# Patient Record
Sex: MALE | URBAN - METROPOLITAN AREA
[De-identification: names, ages, dates, MRNs, and addresses within clinical notes are randomized per-mention and may not be internally consistent; named-entity substitution may affect disease eponyms.]

---

## 2018-02-20 NOTE — CR
Chest: Portable view of the chest was obtained.

 

Comparison: No prior study.

 

Heart size appears within normal limits for portable technique.  Mild 

tortuosity of the thoracic aorta is seen.  Lungs are clear.  Bony 

structures are grossly intact.

 

Impression:

1.  Nothing acute is seen on portable chest x-ray.

 

Diagnostic code #1

## 2018-02-20 NOTE — CT
Head CT

 

Technique: Multiple axial sections through the brain were obtained.  

Intravenous contrast was not utilized.

 

Comparison: No prior intracranial imaging.

 

Findings: Ventricles along with basal cisterns and sulci over the 

convexities appear within normal limits for the patient's age.  No 

abnormal parenchymal densities are seen.  No evidence of intracranial 

hemorrhage.  Slight atherosclerotic calcification is seen within the 

vertebral vessels and carotid siphon.  No midline shift or mass effect

 is seen.

 

Bone window settings were reviewed which shows moderate mucosal 

thickening within the ethmoid sinuses as well as minimal mucosal 

thickening within the frontal and maxillary sinuses.  Mastoid sinuses 

are clear.  No acute calvarial abnormality is seen.

 

Impression:

1.  Sinus disease which is most likely chronic as described above.

2.  Nothing acute is appreciated on noncontrast head CT exam.

 

Diagnostic code #2

## 2018-02-20 NOTE — PCM.HP
H&P History of Present Illness





- General


Date of Service: 18


Admit Problem/Dx: 


Chest Pain





Source of Information: Patient, Provider, RN, RN Notes Reviewed


History Limitations: Reports: No Limitations





- History of Present Illness


Initial Comments - Free Text/Narative: 


Conrado Brito is a 56 yo male who presents to our ED today (18) with chest 

pain.  Reports the pain started 3 days ago and he did notice diaphoresis, 

intermittent nausea, and at times left arm numbness.  He describes the numbness 

as a tingling sensation in his left arm.  Denies any numbness in any other 

extremities.  Denies any extremity weakness.  Denies any change in speech.  He 

has significant family history of CAD.  The patient herself does not have a CAD 

history, however he does have risk factors including diabetes, HLD, HTN, and 

obesity.  He reports some of his uncles  in their 60s.  Father is alive and 

doing well however he did have a quadruple bypass in his 60s.





In the temp was 35.9 Celsius.  Pulse 68.  Respirations 20.  /70.  Pulse 

ox 99% on oxygen.  The lead EKG is obtained that showed Q waves in 2, 3, aVF.  

There is poor R-wave progression.  He does have a first-degree heart block.  Is 

also nonspecific ST changes noted.  Labs are obtained: Diabetes she is normal 

at 7.79.  Hemoglobin is 14.3.  Hematocrit 41.4.  He is normocytic.  Platelet 

are good at 237,000.  Neutrophils are good at 41%.  There is no bandemia.  PT 

is 10.7.  INR 0.98.  APTT 26.  It was 141.  Potassium 3.5.  Chloride 102.  

Anoxic 24.  Anion gap is quite high at 18.5.  BUN is 21.  Creatinine 1.1.  EGFR 

is given 60.  Glucose is slightly high at 123.  Calcium is 9.1.  Albumin 0.4.  

Liver enzymes looked good with AST at 24, ALT at 49, alkaline phosphatase at 

82.  Troponin initially was 0.046.  It was rechecked and found to be 0.047.  

Protein is high at 8.3.  Albumin good at 4.1.  Patient was given 324 mg aspirin

, 5 mg Lopressor, 2 mg morphine, sublingual nitroglycerin 0.4, and 1 g 

nitroglycerin paste.  Heart was obtained and found to be 5. Head CT was 

obtained and interpreted by Dr. Lomeli as "1.  Sinus disease which is most 

likely chronic as described.  2.  Nothing acute appreciated and noncontrast 

head CT exam."  Portable chest x-rays obtained and shows nothing acute as 

interpreted by Dr. Lomeli.





He carries a history of: Impaired vision, HLD, HTN, type II DM.  He reportedly 

has undergone 2 prior stress tests which were both negative.  He was never a 

smoker.





He says be admitted for chest pain rule out.  He is a full code.  PCP is Dr. Nelida Velazquez at [facility] in [place].





  ** Chest


Pain Score (Numeric/FACES): 2





- Related Data


Allergies/Adverse Reactions: 


 Allergies











Allergy/AdvReac Type Severity Reaction Status Date / Time


 


No Known Allergies Allergy   Verified 18 21:13











Home Medications: 


 Home Meds





Aspirin [Ecotrin] 81 mg PO DAILY 18 [History]


Doxazosin [Cardura] 4 mg PO DAILY 18 [History]


Enalapril [Vasotec] 10 mg PO DAILY 18 [History]


Ezetimibe [Zetia] 10 mg PO DAILY 18 [History]


Hydrochlorothiazide 25 mg PO DAILY 18 [History]


Insulin Glargine,Hum.Rec.Anlog [Toujeo Solostar] 40 unit SQ BEDTIME 18 [

History]


Nitroglycerin 0.4 mg SL ASDIRECTED PRN 18 [History]


Omeprazole 20 mg PO DAILY 18 [History]


SitaGLIPtin [Januvia] 100 mg PO DAILY 18 [History]


atorvaSTATin [Lipitor] 80 mg PO DAILY 18 [History]


metFORMIN [Glucophage] 500 mg PO BIDMEALS 18 [History]











Past Medical History


HEENT History: Reports: Impaired Vision


Other HEENT History: wears eyeglasses.


Cardiovascular History: Reports: High Cholesterol, Hypertension


Musculoskeletal History: Reports: Fracture


Endocrine/Metabolic History: Reports: Diabetes, Type II, IDDM





- Infectious Disease History


Infectious Disease History: Reports: Chicken Pox, Measles, Mumps





- Past Surgical History


Cardiovascular Surgical History: Reports: Other (See Below)


Other Cardiovascular Surgeries/Procedures: 2 stress tests, negative for MI.


GI Surgical History: Reports: Appendectomy


Musculoskeletal Surgical History: Reports: Shoulder Surgery





Social & Family History





- Tobacco Use


Smoking Status *Q: Never Smoker


Second Hand Smoke Exposure: No





- Caffeine Use


Caffeine Use: Reports: Coffee





- Alcohol Use


Days Per Week of Alcohol Use: 3


Number of Drinks Per Day: 1


Total Drinks Per Week: 3





- Recreational Drug Use


Recreational Drug Use: No





H&P Review of Systems





- Review of Systems:


Review Of Systems: See Below


General: Reports: Diaphoresis (presents with CP ).  Denies: Fever, Chills, 

Malaise, Weakness, Fatigue, Decreased Appetite


HEENT: Reports: No Symptoms.  Denies: Ear Pain, Eye Pain, Headaches, Rhinitis, 

Sore Throat, Visual Changes


Pulmonary: Reports: Shortness of Breath (with CP).  Denies: Wheezing, Pleuritic 

Chest Pain, Cough, Sputum


Cardiovascular: Reports: Chest Pain (2/10), Blood Pressure Problem.  Denies: 

Palpitations, Dyspnea on Exertion, Orthopnea, Edema, Lightheadedness, Syncope


Gastrointestinal: Reports: Nausea (with CP).  Denies: Abdominal Pain, 

Constipation, Diarrhea, Decreased Appetite, Vomiting


Genitourinary: Reports: No Symptoms.  Denies: Dysuria, Frequency, Burning, Pain

, Urgency


Musculoskeletal: Reports: No Symptoms


Skin: Reports: No Symptoms


Psychiatric: Reports: No Symptoms


Neurological: Reports: No Symptoms, Numbness (left arm with CP ), Tingling (

Left hand with CP )


Hematologic/Lymphatic: Reports: No Symptoms


Immunologic: Reports: No Symptoms





Exam





- Exam


Exam: See Below





- Vital Signs


Vital Signs: 


 Last Vital Signs











Temp  96.7 F   18 15:49


 


Pulse  68   18 16:40


 


Resp  20   18 16:40


 


BP  125/70   18 16:40


 


Pulse Ox  99   18 16:40














- Exam


Quality Assessment: Supplemental Oxygen


General: Alert, Oriented, Cooperative.  No: Mild Distress


HEENT: PERRLA, Hearing Intact, Mucosa Moist & Pink, Nares Patent, Normal Nasal 

Septum, Posterior Pharynx Clear, Conjunctiva Clear, EOMI, EACs Clear, TMs Clear


Neck: Supple, Trachea Midline.  No: JVD, Thyromegaly


Lungs: Clear to Auscultation, Normal Respiratory Effort.  No: Decreased Breath 

Sounds, Rales, Rhonchi, Rub, Stridor, Wheezing


Cardiovascular: Regular Rate, Regular Rhythm


GI/Abdominal Exam: Normal Bowel Sounds, Soft, Non-Tender, No Organomegaly, No 

Distention, No Abnormal Bruit, No Mass, Pelvis Stable


 (Male) Exam: Deferred


Rectal (Males) Exam: Deferred


Back Exam: Normal Inspection, Full Range of Motion.  No: CVA Tenderness (L), 

CVA Tenderness (R)


Extremities: Normal Inspection, Normal Range of Motion, Non-Tender, No Pedal 

Edema, Normal Capillary Refill


Peripheral Pulses: 2+: Posterior Tibial (L), Posterior Tibial (R), Dorsalis 

Pedis (L), Dorsalis Pedis (R), 3+: Radial (L), Radial (R)


Skin: Warm, Dry, Intact


Neurological: Cranial Nerves Intact, Strength Equal Bilateral, Normal Speech, 

Normal Tone, Sensation Intact


Neuro Extensive - Mental Status: Alert, Oriented x3, Normal Mood/Affect, Normal 

Cognition, Memory Intact


Neuro Extensive - Motor, Sensory, Reflexes: CN II-XII Intact, Normal Gait.  No: 

Tongue Deviation (L), Tongue Deviation (R), Dysarthria, Facial palsy (L), 

Facial Palsy (R), Hemeplagia (R), Hemeplagia (L), Abnormal Sensation, Motor/

Sensory Deficits


Psychiatric: Alert, Normal Affect, Normal Mood





- Patient Data


Lab Results Last 24 hrs: 


 Laboratory Results - last 24 hr











  18 Range/Units





  15:43 15:45 15:45 


 


WBC   7.79   (4.23-9.07)  K/mm3


 


RBC   4.59 L   (4.63-6.08)  M/mm3


 


Hgb   14.3   (13.7-17.5)  gm/L


 


Hct   41.4   (40.1-51.0)  %


 


MCV   90.2   (79.0-92.2)  fl


 


MCH   31.2   (25.7-32.2)  pg


 


MCHC   34.5   (32.2-35.5)  g/dl


 


RDW Std Deviation   44.0 H   (35.1-43.9)  fL


 


Plt Count   237   (163-337)  K/mm3


 


MPV   9.2 L   (9.4-12.3)  fl


 


Neutrophils % (Manual)   41   (40-60)  %


 


Band Neutrophils %   0   (0-10)  %


 


Lymphocytes % (Manual)   45 H   (20-40)  %


 


Atypical Lymphs %   0   %


 


Monocytes % (Manual)   7   (2-10)  %


 


Eosinophils % (Manual)   5   (0.8-7.0)  %


 


Basophils % (Manual)   2 H   (0.2-1.2)  


 


Platelet Estimate   Adequate   


 


Plt Morphology Comment   Normal   


 


RBC Morph Comment   Normal   


 


PT    10.7  (8.0-13.0)  SECONDS


 


INR    0.98  


 


APTT    26  (22-36)  SECONDS


 


Sodium     (136-145)  mEq/L


 


Potassium     (3.5-5.1)  mEq/L


 


Chloride     ()  mEq/L


 


Carbon Dioxide     (21-32)  mEq/L


 


Anion Gap     (5-15)  


 


BUN     (7-18)  mg/dL


 


Creatinine     (0.7-1.3)  mg/dL


 


Est Cr Clr Drug Dosing     


 


Estimated GFR (MDRD)     (>60)  mL/min


 


BUN/Creatinine Ratio     (14-18)  


 


Glucose     ()  mg/dL


 


POC Glucose  114 H    ()  mg/dL


 


Calcium     (8.5-10.1)  mg/dL


 


Total Bilirubin     (0.2-1.0)  mg/dL


 


AST     (15-37)  U/L


 


ALT     (16-63)  U/L


 


Alkaline Phosphatase     ()  U/L


 


Troponin I     (0.00-0.056)  ng/mL


 


Total Protein     (6.4-8.2)  g/dl


 


Albumin     (3.4-5.0)  g/dl


 


Globulin     gm/dL


 


Albumin/Globulin Ratio     (1-2)  














  18 Range/Units





  15:45 18:05 


 


WBC    (4.23-9.07)  K/mm3


 


RBC    (4.63-6.08)  M/mm3


 


Hgb    (13.7-17.5)  gm/L


 


Hct    (40.1-51.0)  %


 


MCV    (79.0-92.2)  fl


 


MCH    (25.7-32.2)  pg


 


MCHC    (32.2-35.5)  g/dl


 


RDW Std Deviation    (35.1-43.9)  fL


 


Plt Count    (163-337)  K/mm3


 


MPV    (9.4-12.3)  fl


 


Neutrophils % (Manual)    (40-60)  %


 


Band Neutrophils %    (0-10)  %


 


Lymphocytes % (Manual)    (20-40)  %


 


Atypical Lymphs %    %


 


Monocytes % (Manual)    (2-10)  %


 


Eosinophils % (Manual)    (0.8-7.0)  %


 


Basophils % (Manual)    (0.2-1.2)  


 


Platelet Estimate    


 


Plt Morphology Comment    


 


RBC Morph Comment    


 


PT    (8.0-13.0)  SECONDS


 


INR    


 


APTT    (22-36)  SECONDS


 


Sodium  141   (136-145)  mEq/L


 


Potassium  3.5   (3.5-5.1)  mEq/L


 


Chloride  102   ()  mEq/L


 


Carbon Dioxide  24   (21-32)  mEq/L


 


Anion Gap  18.5 H   (5-15)  


 


BUN  21 H   (7-18)  mg/dL


 


Creatinine  1.1   (0.7-1.3)  mg/dL


 


Est Cr Clr Drug Dosing  TNP   


 


Estimated GFR (MDRD)  > 60   (>60)  mL/min


 


BUN/Creatinine Ratio  19.1 H   (14-18)  


 


Glucose  123 H   ()  mg/dL


 


POC Glucose    ()  mg/dL


 


Calcium  9.1   (8.5-10.1)  mg/dL


 


Total Bilirubin  0.4   (0.2-1.0)  mg/dL


 


AST  24   (15-37)  U/L


 


ALT  49   (16-63)  U/L


 


Alkaline Phosphatase  82   ()  U/L


 


Troponin I  0.046  0.047  (0.00-0.056)  ng/mL


 


Total Protein  8.3 H   (6.4-8.2)  g/dl


 


Albumin  4.1   (3.4-5.0)  g/dl


 


Globulin  4.2   gm/dL


 


Albumin/Globulin Ratio  1.0   (1-2)  











Result Diagrams: 


 18 15:45





 18 15:45





*Q Meaningful Use (ADM)





- VTE *Q


VTE Criteria *Q: 








- Stroke *Q


Stroke Criteria *Q: 








- AMI *Q


AMI Criteria *Q: 








- Problem List


(1) Chest pain


SNOMED Code(s): 30551823


   ICD Code: R07.9 - CHEST PAIN, UNSPECIFIED   Status: Acute   Priority: High   

Current Visit: Yes   


Qualifiers: 


   Chest pain type: unspecified   Qualified Code(s): R07.9 - Chest pain, 

unspecified   





(2) Left arm numbness


SNOMED Code(s): 435912659


   ICD Code: R20.0 - ANESTHESIA OF SKIN   Status: Acute   Priority: High   

Current Visit: Yes   





(3) Type II diabetes mellitus


SNOMED Code(s): 16927765


   ICD Code: E11.9 - TYPE 2 DIABETES MELLITUS WITHOUT COMPLICATIONS   Status: 

Chronic   Priority: Low   Current Visit: Yes   


Qualifiers: 


   Diabetes mellitus complication status: without complication   Diabetes 

mellitus long term insulin use: without long term use   Qualified Code(s): 

E11.9 - Type 2 diabetes mellitus without complications   





(4) HLD (hyperlipidemia)


SNOMED Code(s): 45976787


   ICD Code: E78.5 - HYPERLIPIDEMIA, UNSPECIFIED   Status: Chronic   Priority: 

Low   Current Visit: No   


Qualifiers: 


   Hyperlipidemia type: unspecified   Qualified Code(s): E78.5 - Hyperlipidemia

, unspecified   





(5) HTN (hypertension)


SNOMED Code(s): 07355756


   ICD Code: I10 - ESSENTIAL (PRIMARY) HYPERTENSION   Status: Chronic   Priority

: Low   Current Visit: Yes   


Qualifiers: 


   Hypertension type: essential hypertension   Qualified Code(s): I10 - 

Essential (primary) hypertension   


Problem List Initiated/Reviewed/Updated: Yes


Orders Last 24hrs: 


 Active Orders 24 hr











 Category Date Time Status


 


 Ambulate [RC] PER UNIT ROUTINE Care  18 19:34 Active


 


 Cardiac Monitoring [RC] CONTINUOUS Care  18 19:34 Active


 


 Communication Order [RC] ASDIRECTED Care  18 19:42 Active


 


 EKG Documentation Completion [RC] AM Care  18 06:00 Active


 


 EKG Documentation Completion [RC] STAT Care  18 15:50 Active


 


 Height and Weight [RC] DAILY Care  18 19:33 Active


 


 Intake and Output [RC] QSHIFT Care  18 19:34 Active


 


 Oxygen Therapy [RC] PRN Care  18 19:33 Active


 


 Pulse Oximetry [RC] PRN Care  18 19:34 Active


 


 Up ad Kay [RC] ASDIRECTED Care  18 19:33 Active


 


 VTE/DVT Education [RC] PER UNIT ROUTINE Care  18 19:33 Active


 


 Vital Signs [RC] Q4H Care  18 19:33 Active


 


 Consult to Case Management [CONS] Routine Cons  18 19:33 Active


 


 Heart Healthy Diet [DIET] Diet  18 Dinner Active


 


 Nothing per Oral After Midnight Diet [DIET] Diet  18 Dinner Active


 


 Cardiolite Stress Test [Myocardial Perf Spect Multi] [ Exams  18 07:00 

Ordered





 NM] Routine   


 


 BASIC METABOLIC PANEL,BMP [CHEM] AM Lab  18 05:11 Ordered


 


 BASIC METABOLIC PANEL,BMP [CHEM] AM Lab  18 05:11 Ordered


 


 BASIC METABOLIC PANEL,BMP [CHEM] AM Lab  18 05:11 Ordered


 


 BASIC METABOLIC PANEL,BMP [CHEM] AM Lab  18 05:11 Ordered


 


 CBC WITH AUTO DIFF [HEME] AM Lab  18 05:11 Ordered


 


 CBC WITH AUTO DIFF [HEME] AM Lab  18 05:11 Ordered


 


 CBC WITH AUTO DIFF [HEME] AM Lab  18 05:11 Ordered


 


 CBC WITH AUTO DIFF [HEME] AM Lab  18 05:11 Ordered


 


 CKMB [CHEM] Routine Lab  18 05:11 Ordered


 


 MAGNESIUM [CHEM] AM Lab  18 05:11 Ordered


 


 MAGNESIUM [CHEM] AM Lab  18 05:11 Ordered


 


 MAGNESIUM [CHEM] AM Lab  18 05:11 Ordered


 


 MAGNESIUM [CHEM] AM Lab  18 05:11 Ordered


 


 TROPONIN I [CHEM] Routine Lab  18 05:11 Ordered


 


 Acetaminophen [Tylenol] Med  18 19:33 Active





 650 mg PO Q4H PRN   


 


 Magnesium Rep Pharmacy to Dose [Pharmacy to Dose - Med  18 19:45 Active





 Magnesium Replacement]   





 1 dose .XX ASDIRECTED   


 


 Ondansetron [Zofran ODT] Med  18 19:33 Active





 4 mg PO Q6H PRN   


 


 Ondansetron [Zofran] Med  18 19:33 Active





 4 mg IV Q6H PRN   


 


 Potassium Rep Pharmacy to Dose [Pharmacy to Dose - Med  18 19:45 Active





 Potassium Replacement]   





 1 dose .XX ASDIRECTED   


 


 ECG Stress Exercise [OM.PC] Routine Oth  18 07:00 Ordered


 


 Resuscitation Status Routine Resus Stat  18 19:33 Ordered








 Medication Orders





Acetaminophen (Tylenol)  650 mg PO Q4H PRN


   PRN Reason: Pain (Mild 1-3)/fever


Magnesium Sulfate (Pharmacy To Dose - Magnesium Replacement)  1 dose .XX 

[facility] [place]


Ondansetron HCl (Zofran Odt)  4 mg PO Q6H PRN


   PRN Reason: nausea, able to take PO


Ondansetron HCl (Zofran)  4 mg IV Q6H PRN


   PRN Reason: Nausea/Vomiting


Potassium Chloride (Pharmacy To Dose - Potassium Replacement)  1 dose .XX 

[facility]








Assessment/Plan Comment:: 


I/P:





Acute:





Chest pain


   -Accompanied by SOB, Left arm numbness, Left hand tingling, mild pain in mid 

back, diaphoresis, nausea


   -Risk Factors: Diabetic, HLD, HTN, obesity, family hx/o CAD


   -Dull but will occasionally have sharp episodes


   -Started 3 days ago


   -No prior cardiac history, has had 2 prior stress tests with no concerns


   -12-lead in ED- sinus rhythm, Q waves on II, III, aVF, abnormal R wave 

progression, 1st degree HB, Nonspecific ST changes


   -Troponin 0.046-->0.047


   -ASA, SL nitro, Nitro paste given in ED


   -Stress test in AM


      -NPO after midnight


      -Stop nitro 


      -No caffeine  


      -No metoprolol, hold meds until after test


   -Repeat 12-lead, Troponin in AM; CKMB ordered


   -Telemetry





Chronic:


Type II DM - home meds, blood sugars QID AC and bedtime, Sliding scale as needed


HLD - resume meds after stress test


HTN - resume meds after stress test 





Plan:


Admit to medical floor observation with telemetry


CM for discharge planning


Pt. is ambulatory so no Pt/OT now


Routine AM labs


Other orders as indicated above


Home meds as ordered


DVT prophylaxis: DEANGELO hose and ambulate





Code status: Full code; PCP: Dr. Nelida Velazquez at [facility] in 

[place].

## 2018-02-20 NOTE — EDM.PDOC
ED HPI GENERAL MEDICAL PROBLEM





- General


Chief Complaint: Neuro Symptoms/Deficits


Stated Complaint: L SIDE NUMBNESS,FACE TINGLING,CHEST PAIN


Time Seen by Provider: 18 15:37





- History of Present Illness


INITIAL COMMENTS - FREE TEXT/NARRATIVE: 





57-year-old male presents emergency room with chest pain.





The chest pain started 3 days ago he has noticed increased diaphoresis 

intermittent nausea and at times numbness in his left arm numbness was first 

noticed 3 days ago along with chest pain. The numbness is described mostly as a 

tingling sensation in his left arm. He has not had any numbness in his left leg 

he has not had any extremity weakness on his left side or on his right side. He 

has not had any change in speech and his otherwise not had any usual symptoms 

other than the chest pressure worsening diaphoresis and nausea. Patient has 

significant family history of coronary artery disease the patient does not have 

a history of coronary air artery disease however he has significant diabetes 

hyperlipidemia and hypertension. He has some obesity. Family history 

significant for uncles that  in their 60s. Patient's father is doing well 

however had a quadruple bypass in his 60s.


  ** Chest


Pain Score (Numeric/FACES): 2





- Related Data


 Allergies











Allergy/AdvReac Type Severity Reaction Status Date / Time


 


No Known Allergies Allergy   Verified 06/20/15 23:40 CDT











Home Meds: 


 Home Meds





Aspirin [Ecotrin] 81 mg PO DAILY 18 [History]


Doxazosin [Cardura] 4 mg PO DAILY 18 [History]


Enalapril [Vasotec] 10 mg PO DAILY 18 [History]


Ezetimibe [Zetia] 10 mg PO DAILY 18 [History]


Hydrochlorothiazide 25 mg PO DAILY 18 [History]


Insulin Glargine,Hum.Rec.Anlog [Toujeo Solostar] 40 unit SQ BEDTIME 18 [

History]


Nitroglycerin 0.4 mg SL ASDIRECTED PRN 18 [History]


Omeprazole 20 mg PO DAILY 18 [History]


SitaGLIPtin [Januvia] 100 mg PO DAILY 18 [History]


atorvaSTATin [Lipitor] 80 mg PO DAILY 18 [History]


metFORMIN [Glucophage] 500 mg PO BID 18 [History]











Past Medical History


HEENT History: Reports: Impaired Vision


Other HEENT History: wears eyeglasses.


Cardiovascular History: Reports: High Cholesterol, Hypertension


Musculoskeletal History: Reports: Fracture


Endocrine/Metabolic History: Reports: Diabetes, Type II, IDDM





- Infectious Disease History


Infectious Disease History: Reports: Chicken Pox, Measles, Mumps





- Past Surgical History


Cardiovascular Surgical History: Reports: Other (See Below)


Other Cardiovascular Surgeries/Procedures: 2 stress tests, negative for MI.


GI Surgical History: Reports: Appendectomy


Musculoskeletal Surgical History: Reports: Shoulder Surgery





Social & Family History





- Tobacco Use


Smoking Status *Q: Never Smoker


Second Hand Smoke Exposure: No





- Caffeine Use


Caffeine Use: Reports: Coffee





- Alcohol Use


Days Per Week of Alcohol Use: 3


Number of Drinks Per Day: 1


Total Drinks Per Week: 3





- Recreational Drug Use


Recreational Drug Use: No





ED ROS GENERAL





- Review of Systems


Review Of Systems: See Below


Constitutional: Reports: No Symptoms


HEENT: Reports: No Symptoms


Respiratory: Reports: Shortness of Breath ( associated with chest pain)


Cardiovascular: Reports: Chest Pain, Blood Pressure Problem.  Denies: Edema, 

Syncope


Endocrine: Denies: Fatigue


GI/Abdominal: Reports: Nausea.  Denies: Abdominal Pain, Constipation, Vomiting


: Reports: No Symptoms


Musculoskeletal: Reports: No Symptoms


Skin: Reports: No Symptoms


Neurological: Reports: Other (Numbness in his left arm when the chest pain is 

at its worst he describes this more as a tingling sensation)


Psychiatric: Reports: No Symptoms


Hematologic/Lymphatic: Reports: No Symptoms


Immunologic: Reports: No Symptoms





ED EXAM, GENERAL





- Physical Exam


Exam: See Below


Exam Limited By: No Limitations


General Appearance: Anxious, Other (Patient's was initially little anxious we 

came his blood pressure was quite elevated this responded to him calming down 

getting some sublingual nitroglycerin. He was diaphoretic this improved 

somewhat.)


Eye Exam: Bilateral Eye: Normal Inspection


Head: Atraumatic, Normocephalic


Neck: Normal Inspection, Supple, Non-Tender, Full Range of Motion.  No: 

Lymphadenopathy (L), Lymphadenopathy (R)


Respiratory/Chest: No Respiratory Distress, Lungs Clear, Normal Breath Sounds


Cardiovascular: Normal Peripheral Pulses, Regular Rate, Rhythm, No Edema


GI/Abdominal: Normal Bowel Sounds, Soft, Non-Tender


Back Exam: Normal Inspection.  No: CVA Tenderness (L), CVA Tenderness (R)


Extremities: Normal Inspection, No Pedal Edema


Neurological: Alert, Oriented, Normal Cognition, Other (Immediately upon 

arrival the patient demonstrated intact cranial nerves II through XII. He had 

no evidence of any upper extremity weakness. And he was ambulatory without 

difficulty)


Psychiatric: Normal Affect, Normal Mood


Skin Exam: Other (He was somewhat diaphoretic upon arrival)


Lymphatic: No Adenopathy





Course





- Vital Signs


Last Recorded V/S: 


 Last Vital Signs











Temp  35.9 C   18 15:49


 


Pulse  68   18 16:40


 


Resp  20   18 16:40


 


BP  125/70   18 16:40


 


Pulse Ox  99   18 16:40














- Orders/Labs/Meds


Orders: 


 Active Orders 24 hr











 Category Date Time Status


 


 EKG Documentation Completion [RC] STAT Care  18 15:50 Active











Labs: 


 Laboratory Tests











  18 Range/Units





  15:43 15:45 15:45 


 


WBC   7.79   (4.23-9.07)  K/mm3


 


RBC   4.59 L   (4.63-6.08)  M/mm3


 


Hgb   14.3   (13.7-17.5)  gm/L


 


Hct   41.4   (40.1-51.0)  %


 


MCV   90.2   (79.0-92.2)  fl


 


MCH   31.2   (25.7-32.2)  pg


 


MCHC   34.5   (32.2-35.5)  g/dl


 


RDW Std Deviation   44.0 H   (35.1-43.9)  fL


 


Plt Count   237   (163-337)  K/mm3


 


MPV   9.2 L   (9.4-12.3)  fl


 


Neutrophils % (Manual)   41   (40-60)  %


 


Band Neutrophils %   0   (0-10)  %


 


Lymphocytes % (Manual)   45 H   (20-40)  %


 


Atypical Lymphs %   0   %


 


Monocytes % (Manual)   7   (2-10)  %


 


Eosinophils % (Manual)   5   (0.8-7.0)  %


 


Basophils % (Manual)   2 H   (0.2-1.2)  


 


Platelet Estimate   Adequate   


 


Plt Morphology Comment   Normal   


 


RBC Morph Comment   Normal   


 


PT    10.7  (8.0-13.0)  SECONDS


 


INR    0.98  


 


APTT    26  (22-36)  SECONDS


 


Sodium     (136-145)  mEq/L


 


Potassium     (3.5-5.1)  mEq/L


 


Chloride     ()  mEq/L


 


Carbon Dioxide     (21-32)  mEq/L


 


Anion Gap     (5-15)  


 


BUN     (7-18)  mg/dL


 


Creatinine     (0.7-1.3)  mg/dL


 


Est Cr Clr Drug Dosing     


 


Estimated GFR (MDRD)     (>60)  mL/min


 


BUN/Creatinine Ratio     (14-18)  


 


Glucose     ()  mg/dL


 


POC Glucose  114 H    ()  mg/dL


 


Calcium     (8.5-10.1)  mg/dL


 


Total Bilirubin     (0.2-1.0)  mg/dL


 


AST     (15-37)  U/L


 


ALT     (16-63)  U/L


 


Alkaline Phosphatase     ()  U/L


 


Troponin I     (0.00-0.056)  ng/mL


 


Total Protein     (6.4-8.2)  g/dl


 


Albumin     (3.4-5.0)  g/dl


 


Globulin     gm/dL


 


Albumin/Globulin Ratio     (1-2)  














  18 Range/Units





  15:45 18:05 


 


WBC    (4.23-9.07)  K/mm3


 


RBC    (4.63-6.08)  M/mm3


 


Hgb    (13.7-17.5)  gm/L


 


Hct    (40.1-51.0)  %


 


MCV    (79.0-92.2)  fl


 


MCH    (25.7-32.2)  pg


 


MCHC    (32.2-35.5)  g/dl


 


RDW Std Deviation    (35.1-43.9)  fL


 


Plt Count    (163-337)  K/mm3


 


MPV    (9.4-12.3)  fl


 


Neutrophils % (Manual)    (40-60)  %


 


Band Neutrophils %    (0-10)  %


 


Lymphocytes % (Manual)    (20-40)  %


 


Atypical Lymphs %    %


 


Monocytes % (Manual)    (2-10)  %


 


Eosinophils % (Manual)    (0.8-7.0)  %


 


Basophils % (Manual)    (0.2-1.2)  


 


Platelet Estimate    


 


Plt Morphology Comment    


 


RBC Morph Comment    


 


PT    (8.0-13.0)  SECONDS


 


INR    


 


APTT    (22-36)  SECONDS


 


Sodium  141   (136-145)  mEq/L


 


Potassium  3.5   (3.5-5.1)  mEq/L


 


Chloride  102   ()  mEq/L


 


Carbon Dioxide  24   (21-32)  mEq/L


 


Anion Gap  18.5 H   (5-15)  


 


BUN  21 H   (7-18)  mg/dL


 


Creatinine  1.1   (0.7-1.3)  mg/dL


 


Est Cr Clr Drug Dosing  TNP   


 


Estimated GFR (MDRD)  > 60   (>60)  mL/min


 


BUN/Creatinine Ratio  19.1 H   (14-18)  


 


Glucose  123 H   ()  mg/dL


 


POC Glucose    ()  mg/dL


 


Calcium  9.1   (8.5-10.1)  mg/dL


 


Total Bilirubin  0.4   (0.2-1.0)  mg/dL


 


AST  24   (15-37)  U/L


 


ALT  49   (16-63)  U/L


 


Alkaline Phosphatase  82   ()  U/L


 


Troponin I  0.046  0.047  (0.00-0.056)  ng/mL


 


Total Protein  8.3 H   (6.4-8.2)  g/dl


 


Albumin  4.1   (3.4-5.0)  g/dl


 


Globulin  4.2   gm/dL


 


Albumin/Globulin Ratio  1.0   (1-2)  











Meds: 


Medications














Discontinued Medications














Generic Name Dose Route Start Last Admin





  Trade Name Freq  PRN Reason Stop Dose Admin


 


Aspirin  Confirm  18 15:51  18 16:10





  Aspirin  Administered  18 15:52  Not Given





  Dose   





  324 mg   





  .ROUTE   





  .STK-MED ONE   


 


Aspirin  324 mg  18 15:40  18 15:40





  Aspirin  PO  18 15:41  324 mg





  ONETIME ONE   Administration


 


Metoprolol Tartrate  5 mg  18 15:49  18 16:44





  Lopressor  IVPUSH  18 15:50  Not Given





  ONETIME ONE   


 


Morphine Sulfate  2 mg  18 16:21  18 16:35





  Morphine  IVPUSH  18 16:22  2 mg





  ONETIME ONE   Administration


 


Nitroglycerin  0.4 mg  18 15:48  18 16:15





  Nitrostat  SL   0.4 mg





  Q5M PRN   Administration





  Chest Pain   


 


Nitroglycerin  1 gm  18 17:14  18 17:24





  Nitro-Bid 2%  TOP  18 17:15  1 gm





  ONETIME ONE   Administration














- Re-Assessments/Exams


Free Text/Narrative Re-Assessment/Exam: 





18 18:38


Patient presented to the emergency room he had good improvement but not 

complete with sublingual nitroglycerin he was started on nitro paste and then 

went on to have complete resolution of his chest pressure. Early on he had some 

left arm numbness this responded for the most part on its own he had a normal 

neurologic exam immediately upon admission with no suggestion of a CVA. However 

his history was initially little vague CAT scan was ordered and done that was 

negative for acute changes. Laboratory evaluation is unremarkable the patient's 

heart score because of his family history and his diabetes and his highly 

suspicious history is 5. Patient should not be discharged from the department 

with a heart score of 5 patient's case was discussed with our hospitalist Dr. Hall patient will be placed on observation anticipating stress test in the 

morning the patient thinks he can participate stress test with walking.





Departure





- Departure


Time of Disposition: 18:37


Disposition: Refer to Observation


Clinical Impression: 


 Chest pain, Left arm numbness








- Discharge Information


Referrals: 


PCP,None [Primary Care Provider] - 


Forms:  ED Department Discharge





- My Orders


Last 24 Hours: 


My Active Orders





18 15:50


EKG Documentation Completion [RC] STAT 














- Assessment/Plan


Last 24 Hours: 


My Active Orders





18 15:50


EKG Documentation Completion [RC] STAT

## 2018-02-21 NOTE — PCM.DCSUM1
**Discharge Summary





- Hospital Course


Brief History: Conrado Brito is a 56 yo male with past medical hx/o hypertension

, hyperlipidemia, type 2 diabetes, and morbid obesity who presents to ED (2/20/ 18) with 3 day hx/o chest pain. He was admitted for CP r/o ACS.





- Discharge Data


Discharge Date: 02/21/18


Discharge Disposition: DC/Tfer to Acute Hospital 02


Condition: Good





- Discharge Diagnosis/Problem(s)


(1) Anginal chest pain at rest


SNOMED Code(s): 93491652


   ICD Code: I20.8 - OTHER FORMS OF ANGINA PECTORIS   Status: Acute   





(2) Positive cardiac stress test


Status: Acute   





(3) Obesity (BMI 30-39.9)


SNOMED Code(s): 853042239


   ICD Code: E66.9 - OBESITY, UNSPECIFIED   Status: Chronic   





(4) Left arm numbness


SNOMED Code(s): 888003235


   ICD Code: R20.0 - ANESTHESIA OF SKIN   Status: Resolved   Priority: High   





(5) HTN (hypertension)


SNOMED Code(s): 88276125


   ICD Code: I10 - ESSENTIAL (PRIMARY) HYPERTENSION   Status: Chronic   Priority

: Low   


Qualifiers: 


   Hypertension type: essential hypertension   Qualified Code(s): I10 - 

Essential (primary) hypertension   





(6) Type II diabetes mellitus


SNOMED Code(s): 30006392


   ICD Code: E11.9 - TYPE 2 DIABETES MELLITUS WITHOUT COMPLICATIONS   Status: 

Chronic   Priority: Low   


Qualifiers: 


   Diabetes mellitus complication status: without complication   Diabetes 

mellitus long term insulin use: without long term use   Qualified Code(s): 

E11.9 - Type 2 diabetes mellitus without complications   





(7) HLD (hyperlipidemia)


SNOMED Code(s): 65314684


   ICD Code: E78.5 - HYPERLIPIDEMIA, UNSPECIFIED   Status: Chronic   Priority: 

Low   


Qualifiers: 


   Hyperlipidemia type: unspecified   Qualified Code(s): E78.5 - Hyperlipidemia

, unspecified   





- Patient Summary/Data


Operative Procedure(s) Performed: None


Complications: None


Consults: 


[facility] with Dr. Boyer for possible heart cath


Labs Pending at D/C: 


None





Recommended Follow-up Testing/Procedures: 


None





Planned Operative Procedure(s) after DC: Possible PCI/Heart Cath


Hospital Course: 


Patient was primarily admitted for chest pain rule out ACS. He carried cardiac 

risk factors such as hypertension, hyperlipidemia, type 2 diabetes, morbid 

obesity, and strong family history of cardiovascular disease. Patient received 

initial treatment in the emergency department to improve his symptoms. This 

morning he underwent cardiac stress test and was able to complete phase I of 

it. However near the end of the procedure, he experienced recurrence of chest 

tightness with left arm numbness. His symptoms did resolve after completion of 

the EKG portion of his stress test. 





Later on while waiting for his nuclear stress test report, the patient 

developed sudden onset of chest pain at rest. Subsequently, he was found 

positive on his nuclear stress test with findings of small area of reversible 

ischemia within the posterior wall of his heart and a low ejection fraction of 

47%.





Above abnormal findings along with the sudden onset of chest pain at rest was 

discussed to the [facility] on-call cardiologist, Dr. Boyer. Patient was then 

transferred to [facility] for further cardiac services. He was admitted 

under the services of Dr. Patel, attending hospitalist. Patient left via EMS 

with heparin drip for ACS protocol. 





- Patient Instructions


Diet: Heart Healthy Diet, Usual Diet as Tolerated, Diabetic Diet, Weight Loss 

Diet


Activity: As Tolerated


Driving: Do Not Drive


Showering/Bathing: May Shower


Notify Provider of: Fever, Increased Pain, Nausea and/or Vomiting


Other/Special Instructions: - Transfer to [facility] under the services 

of Dr. CAROLANN Patel, Hospitalist.  with consultation from Dr. Boyer, Cardiologist





- Discharge Plan


Home Medications: 


 Home Meds





Aspirin [Ecotrin] 81 mg PO DAILY 02/20/18 [History]


Doxazosin [Cardura] 4 mg PO DAILY 02/20/18 [History]


Enalapril [Vasotec] 10 mg PO DAILY 02/20/18 [History]


Ezetimibe [Zetia] 10 mg PO DAILY 02/20/18 [History]


Hydrochlorothiazide 25 mg PO DAILY 02/20/18 [History]


Insulin Glargine,Hum.Rec.Anlog [Toujeo Solostar] 40 unit SQ BEDTIME 02/20/18 [

History]


Nitroglycerin 0.4 mg SL ASDIRECTED PRN 02/20/18 [History]


Omeprazole 20 mg PO DAILY 02/20/18 [History]


SitaGLIPtin [Januvia] 100 mg PO DAILY 02/20/18 [History]


atorvaSTATin [Lipitor] 80 mg PO DAILY 02/20/18 [History]


metFORMIN [Glucophage] 500 mg PO BIDMEALS 02/20/18 [History]








Referrals: 


PCP,None [Primary Care Provider] - 





- Discharge Summary/Plan Comment


DC Time >30 min.: Yes (45 mins)


Discharge Summary/Plan Comment: 


Transfer to [facility] for cardiac services. He will be admitted under 

the Services of Dr. Patel, Hospitalist.








- General Info


Date of Service: 02/21/18


Admission Dx/Problem (Free Text: 


Chest Pain





Subjective Update: 


follow Up





Functional Status: Reports: Pain Controlled, Tolerating Diet, Ambulating, 

Urinating.  Denies: New Symptoms





- Review of Systems


General: Denies: Fever, Weakness, Fatigue, Malaise, Chills


HEENT: Reports: No Symptoms


Pulmonary: Denies: Shortness of Breath


Cardiovascular: Reports: Chest Pain (at rest).  Denies: Palpitations, Dyspnea 

on Exertion, Lightheadedness


Gastrointestinal: Denies: Abdominal Pain, Nausea, Vomiting


Genitourinary: Reports: No Symptoms


Musculoskeletal: Reports: No Symptoms


Skin: Denies: Cyanosis, Mottled, Pallor, Diaphoresis


Neurological: Denies: Confusion, Difficulty Walking, Weakness, Gait Disturbance


Psychiatric: Denies: Mood Lability, Anxiety, Agitation, Hallucinations


Systems Review Comment: 


No overnight or acute issues. He completed his cardiac stress test. 

Unfortunately, while waiting for the result of his nuclear test he experienced 

recurrence of chest pain at rest.








- Patient Data


Vitals - Most Recent: 


 Last Vital Signs











Temp  36.7 C   02/21/18 14:44


 


Pulse  70   02/21/18 14:44


 


Resp  20   02/21/18 14:44


 


BP  116/77   02/21/18 14:44


 


Pulse Ox  92 L  02/21/18 14:44











Weight - Most Recent: 134.037 kg


I&O - Last 24 hours: 


 Intake & Output











 02/21/18 02/21/18 02/21/18





 06:59 14:59 22:59


 


Intake Total 400  500


 


Output Total 1425  1500


 


Balance -1025  -1000











Lab Results - Last 24 hrs: 


 Laboratory Results - last 24 hr











  02/20/18 02/21/18 02/21/18 Range/Units





  20:49 06:25 06:55 


 


WBC    6.28  (4.23-9.07)  K/mm3


 


RBC    4.38 L  (4.63-6.08)  M/mm3


 


Hgb    13.5 L  (13.7-17.5)  gm/L


 


Hct    39.7 L  (40.1-51.0)  %


 


MCV    90.6  (79.0-92.2)  fl


 


MCH    30.8  (25.7-32.2)  pg


 


MCHC    34.0  (32.2-35.5)  g/dl


 


RDW Std Deviation    43.5  (35.1-43.9)  fL


 


Plt Count    210  (163-337)  K/mm3


 


MPV    9.4  (9.4-12.3)  fl


 


Neut % (Auto)    50.6  (34.0-67.9)  %


 


Lymph % (Auto)    34.9  (21.8-53.1)  %


 


Mono % (Auto)    7.5  (5.3-12.2)  %


 


Eos % (Auto)    6.2  (0.8-7.0)  


 


Baso % (Auto)    0.8  (0.1-1.2)  %


 


Neut # (Auto)    3.18  (1.78-5.38)  K/mm3


 


Lymph # (Auto)    2.19  (1.32-3.57)  K/mm3


 


Mono # (Auto)    0.47  (0.30-0.82)  K/mm3


 


Eos # (Auto)    0.39  (0.04-0.54)  K/mm3


 


Baso # (Auto)    0.05  (0.01-0.08)  K/mm3


 


PT     (8.0-13.0)  SECONDS


 


INR     


 


Sodium     (136-145)  mEq/L


 


Potassium     (3.5-5.1)  mEq/L


 


Chloride     ()  mEq/L


 


Carbon Dioxide     (21-32)  mEq/L


 


Anion Gap     (5-15)  


 


BUN     (7-18)  mg/dL


 


Creatinine     (0.7-1.3)  mg/dL


 


Est Cr Clr Drug Dosing     mL/min


 


Estimated GFR (MDRD)     (>60)  mL/min


 


BUN/Creatinine Ratio     (14-18)  


 


Glucose     ()  mg/dL


 


POC Glucose  111 H  107 H   ()  mg/dL


 


Calcium     (8.5-10.1)  mg/dL


 


Magnesium     (1.8-2.4)  mg/dl


 


CK-MB (CK-2)     (0-3.6)  ng/ml


 


Troponin I     (0.00-0.056)  ng/mL














  02/21/18 02/21/18 02/21/18 Range/Units





  06:55 12:52 17:29 


 


WBC     (4.23-9.07)  K/mm3


 


RBC     (4.63-6.08)  M/mm3


 


Hgb     (13.7-17.5)  gm/L


 


Hct     (40.1-51.0)  %


 


MCV     (79.0-92.2)  fl


 


MCH     (25.7-32.2)  pg


 


MCHC     (32.2-35.5)  g/dl


 


RDW Std Deviation     (35.1-43.9)  fL


 


Plt Count     (163-337)  K/mm3


 


MPV     (9.4-12.3)  fl


 


Neut % (Auto)     (34.0-67.9)  %


 


Lymph % (Auto)     (21.8-53.1)  %


 


Mono % (Auto)     (5.3-12.2)  %


 


Eos % (Auto)     (0.8-7.0)  


 


Baso % (Auto)     (0.1-1.2)  %


 


Neut # (Auto)     (1.78-5.38)  K/mm3


 


Lymph # (Auto)     (1.32-3.57)  K/mm3


 


Mono # (Auto)     (0.30-0.82)  K/mm3


 


Eos # (Auto)     (0.04-0.54)  K/mm3


 


Baso # (Auto)     (0.01-0.08)  K/mm3


 


PT     (8.0-13.0)  SECONDS


 


INR     


 


Sodium  139    (136-145)  mEq/L


 


Potassium  4.0    (3.5-5.1)  mEq/L


 


Chloride  103    ()  mEq/L


 


Carbon Dioxide  27    (21-32)  mEq/L


 


Anion Gap  13.0    (5-15)  


 


BUN  18    (7-18)  mg/dL


 


Creatinine  0.8    (0.7-1.3)  mg/dL


 


Est Cr Clr Drug Dosing  115.13    mL/min


 


Estimated GFR (MDRD)  > 60    (>60)  mL/min


 


BUN/Creatinine Ratio  22.5 H    (14-18)  


 


Glucose  113 H    ()  mg/dL


 


POC Glucose   104   ()  mg/dL


 


Calcium  8.8    (8.5-10.1)  mg/dL


 


Magnesium  1.7 L    (1.8-2.4)  mg/dl


 


CK-MB (CK-2)  0.9   0.7  (0-3.6)  ng/ml


 


Troponin I  0.053   0.046  (0.00-0.056)  ng/mL














  02/21/18 02/21/18 Range/Units





  17:49 17:49 


 


WBC    (4.23-9.07)  K/mm3


 


RBC    (4.63-6.08)  M/mm3


 


Hgb    (13.7-17.5)  gm/L


 


Hct    (40.1-51.0)  %


 


MCV    (79.0-92.2)  fl


 


MCH    (25.7-32.2)  pg


 


MCHC    (32.2-35.5)  g/dl


 


RDW Std Deviation    (35.1-43.9)  fL


 


Plt Count   219  (163-337)  K/mm3


 


MPV    (9.4-12.3)  fl


 


Neut % (Auto)    (34.0-67.9)  %


 


Lymph % (Auto)    (21.8-53.1)  %


 


Mono % (Auto)    (5.3-12.2)  %


 


Eos % (Auto)    (0.8-7.0)  


 


Baso % (Auto)    (0.1-1.2)  %


 


Neut # (Auto)    (1.78-5.38)  K/mm3


 


Lymph # (Auto)    (1.32-3.57)  K/mm3


 


Mono # (Auto)    (0.30-0.82)  K/mm3


 


Eos # (Auto)    (0.04-0.54)  K/mm3


 


Baso # (Auto)    (0.01-0.08)  K/mm3


 


PT  11.1   (8.0-13.0)  SECONDS


 


INR  1.04   


 


Sodium    (136-145)  mEq/L


 


Potassium    (3.5-5.1)  mEq/L


 


Chloride    ()  mEq/L


 


Carbon Dioxide    (21-32)  mEq/L


 


Anion Gap    (5-15)  


 


BUN    (7-18)  mg/dL


 


Creatinine    (0.7-1.3)  mg/dL


 


Est Cr Clr Drug Dosing    mL/min


 


Estimated GFR (MDRD)    (>60)  mL/min


 


BUN/Creatinine Ratio    (14-18)  


 


Glucose    ()  mg/dL


 


POC Glucose    ()  mg/dL


 


Calcium    (8.5-10.1)  mg/dL


 


Magnesium    (1.8-2.4)  mg/dl


 


CK-MB (CK-2)    (0-3.6)  ng/ml


 


Troponin I    (0.00-0.056)  ng/mL











Med Orders - Current: 


 Current Medications





Acetaminophen (Tylenol)  650 mg PO Q4H PRN


   PRN Reason: Pain (Mild 1-3)/fever


Aspirin (Halfprin)  81 mg PO DAILY [facility]


   Last Admin: 02/21/18 10:32 Dose:  81 mg


Dextrose/Water (Dextrose 50% In Water)  50 ml IVPUSH ASDIRECTED PRN


   PRN Reason: Hypoglycemia


Doxazosin Mesylate (Cardura)  4 mg PO DAILY [facility]


   Last Admin: 02/21/18 10:30 Dose:  4 mg


Ezetimibe (Zetia)  10 mg PO DAILY [facility]


   Last Admin: 02/21/18 10:28 Dose:  10 mg


Enalapril Maleate (Vasotec)  10 mg PO DAILY [facility]


   Last Admin: 02/21/18 10:29 Dose:  10 mg


Hydrochlorothiazide (Hydrochlorothiazide)  25 mg PO DAILY [facility]


   Last Admin: 02/21/18 10:33 Dose:  25 mg


Heparin Sodium/Dextrose (Heparin 25,000 Units In D5w 500 Ml)  25,000 units in 

500 mls @ 32.169 mls/hr IV TITRATE [facility]; 12 UNITS/KG/HR


   PRN Reason: Protocol


   Last Admin: 02/21/18 18:07 Dose:  32.169 mls/hr


Insulin Aspart (Novolog)  0 unit SUBCUT QIDACANDBED [facility]


   PRN Reason: Protocol


   Last Admin: 02/21/18 18:20 Dose:  Not Given


Insulin Detemir (Levemir)  20 unit SUBCUT BID [facility]


   Last Admin: 02/21/18 10:41 Dose:  20 units


Magnesium Sulfate (Pharmacy To Dose - Magnesium Replacement)  1 dose .XX 

ASDIRECTED [facility]


Metformin HCl (Glucophage)  500 mg PO BIDMEALS [facility]


   Last Admin: 02/21/18 17:58 Dose:  500 mg


Ondansetron HCl (Zofran Odt)  4 mg PO Q6H PRN


   PRN Reason: nausea, able to take PO


Ondansetron HCl (Zofran)  4 mg IV Q6H PRN


   PRN Reason: Nausea/Vomiting


Pantoprazole Sodium (Protonix***)  40 mg PO DAILY@0700 [facility]


   Last Admin: 02/21/18 11:13 Dose:  40 mg


Potassium Chloride (Pharmacy To Dose - Potassium Replacement)  1 dose .XX 

ASDIRECTED [facility]


Rosuvastatin Calcium (Crestor)  20 mg PO DAILY [facility]


   Last Admin: 02/21/18 10:27 Dose:  20 mg





Discontinued Medications





Aspirin (Aspirin) Confirm Administered Dose 324 mg .ROUTE .STK-MED ONE


   Stop: 02/20/18 15:52


   Last Admin: 02/20/18 16:10 Dose:  Not Given


Aspirin (Aspirin)  324 mg PO ONETIME ONE


   Stop: 02/20/18 15:41


   Last Admin: 02/20/18 15:40 Dose:  324 mg


Heparin Sodium (Porcine) (Heparin Sodium)  4,000 units IVPUSH .BOLUS ONE


   Stop: 02/21/18 17:35


   Last Admin: 02/21/18 17:58 Dose:  4,000 units


Magnesium Oxide (Magnesium Oxide)  800 mg PO ONETIME ONE


   Stop: 02/21/18 09:31


   Last Admin: 02/21/18 10:40 Dose:  800 mg


Metoprolol Tartrate (Lopressor)  5 mg IVPUSH ONETIME ONE


   Stop: 02/20/18 15:50


   Last Admin: 02/20/18 16:44 Dose:  Not Given


Morphine Sulfate (Morphine)  2 mg IVPUSH ONETIME ONE


   Stop: 02/20/18 16:22


   Last Admin: 02/20/18 16:35 Dose:  2 mg


Nitroglycerin (Nitrostat)  0.4 mg SL Q5M PRN


   PRN Reason: Chest Pain


   Last Admin: 02/20/18 16:15 Dose:  0.4 mg


Nitroglycerin (Nitro-Bid 2%)  1 gm TOP ONETIME ONE


   Stop: 02/20/18 17:15


   Last Admin: 02/20/18 17:24 Dose:  1 gm


Nitroglycerin (Nitro-Bid 2%)  1 gm TOP ONETIME ONE


   Stop: 02/21/18 16:09


   Last Admin: 02/21/18 16:28 Dose:  1 gm











- Exam


General: Reports: Alert, Oriented, Cooperative, No Acute Distress, Other (Obese)


HEENT: Reports: Pupils Equal, Pupils Reactive, EOMI, Mucous Membr. Moist/Pink


Neck: Reports: Supple, Trachea Midline, No JVD, No Thyromegaly, Other (short 

and thick)


Lungs: Reports: Clear to Auscultation, Normal Respiratory Effort


Cardiovascular: Reports: Regular Rate, Regular Rhythm


GI/Abdominal Exam: Normal Bowel Sounds, Soft, Non-Tender, No Organomegaly, No 

Distention, No Abnormal Bruit, No Mass


 (Male) Exam: Deferred


Rectal (Males) Exam: Deferred


Back Exam: Reports: Normal Inspection, Decreased Range of Motion


Extremities: Normal Inspection, Normal Range of Motion, Non-Tender, No Pedal 

Edema, Normal Capillary Refill


Skin: Reports: Warm, Dry, Intact


Neurological: Reports: No New Focal Deficit


Psy/Mental Status: Reports: Alert, Normal Affect, Normal Mood





*Q Meaningful Use (DIS)





- VTE *Q


VTE Criteria *Q: 








- Stroke *Q


Stroke Criteria *Q: 








- AMI *Q


AMI Criteria *Q:

## 2018-02-21 NOTE — NM
Cardiolite cardiac scan

 

I have data stating the patient was stressed utilizing treadmill 

protocol. Patient reached 139 beats per minute which is also the 

patient's 85% maximum predicted heart rate. Stress dose of technetium 

99m Cardiolite was 10.7 mCi. Rest dose was 31.0 mCi. SPECT imaging was

 obtained in 3 planes for both portions of the study. Study was also 

gated.  Low-dose chest CT performed to allow for attenuation 

correction.

 

Comparison: No prior cardiac imaging. 

 

Findings: Diminished activity is seen within the posterior wall on the

 stress study.  This appears to be improved on the rest exam and is 

suspicious for small area of reversible ischemia.  Activity within the

 left ventricular myocardium is otherwise fairly homogeneous.  

Ejection fraction is low at 47%.  Wall thickening is normal.

 

Impression:

1.  Findings suspicious for an area of reversible ischemia within the 

posterior wall.

2.  Low ejection fraction of 47%.

 

Diagnostic code #3

## 2018-02-21 NOTE — PCM.SN
- Free Text/Narrative


Note: 


Patient was seen and examined at beside. He was asymptomatic prior to 

cardiolite stress test. Phase 1 of his test was outstanding with no findings of 

acute ST-T wave changes. However he developed chest tightness associated with 

left arm numbness and diaphoresis at near end of his stress test but was able 

to complete at least 10 mins of the treadmill. Additionally, Phase 2 of his 

stress test came back with abnormal findings of small area of reversible 

ischemia within the posterior wall and low ejection fraction of 47%.





Patient troponin x 3 were all negative and ekg x 2 showed no acute ST-T wave 

changes. He did good overnight but patient developed chest pain/tightness at 

rest this afternoon while laying in bed. He responded with nitro patch and 

again no acute ST-T wave changes. A repeat troponin and CKMB levels with 

pending results. 





Given this new complaint of chest pain at rest, family was apprehensive about 

him going home. Patient lives in [place], [place]  about 3-3.5 hrs north of 

[place].





Informed patient I will phone in Cardiology in [place] for further input. I 

personally talked to Dr. Boyer and discussed case with him. He was okay with 

lateral transfer with plans for heart catheterization in AM. 





After discussing case with Dr. Patel, Hospitalist, she requested patient be 

put on heparin drip on his way out to them. Patient will leave here via EMS as 

soon as transportation arrives.

## 2018-02-22 NOTE — STRESS
REQUESTING PHYSICIAN:

 

DATE:  02/20/2018

 

TEST:

Cardiolite treadmill exercise stress test.

 

CLINICAL DATA:

This is a 57-year-old white male with past medical history of hypertension,

diabetes, hyperlipidemia.

 

DESCRIPTION OF PROCEDURE:

The patient was exercised on the treadmill to maximum tolerance achieving after

10 minutes and 11 seconds with a peak heart rate of 140 beats per minute with a

workload of 8.7 METS.  There was a normal blood pressure response.  The patient

did complain of chest tightness and numbness to his left arm near the end of 
the test. 

His baseline EKG shows sinus rhythm. No significant electrographic 
abnormalities observed.

There are no ischemic ST-segment changes seen during the exercise or the 
recovery process.

 

FINDINGS:

1. Indeterminate phase 1 of the cardiac stress test.

2. Normal blood pressure response.

3. Nuclear scan interpretation will be done separately.

 

DD:  02/21/2018 23:41:46

DT:  02/22/2018 03:05:16  MMODAL

Job #:  [id number]

MTDD

## 2018-06-26 ENCOUNTER — TRANSFERRED RECORDS (OUTPATIENT)
Dept: HEALTH INFORMATION MANAGEMENT | Facility: CLINIC | Age: 58
End: 2018-06-26

## 2018-07-20 ENCOUNTER — MEDICAL CORRESPONDENCE (OUTPATIENT)
Dept: HEALTH INFORMATION MANAGEMENT | Facility: CLINIC | Age: 58
End: 2018-07-20

## 2018-07-20 ENCOUNTER — TRANSFERRED RECORDS (OUTPATIENT)
Dept: HEALTH INFORMATION MANAGEMENT | Facility: CLINIC | Age: 58
End: 2018-07-20

## 2018-07-24 NOTE — TELEPHONE ENCOUNTER
FUTURE VISIT INFORMATION      FUTURE VISIT INFORMATION:    Date: 08/06/18    Time: 230pm    Location: [facility]  REFERRAL INFORMATION:    Referring provider:  JOSE CAMP    Referring providers clinic:  [facility]    Reason for visit/diagnosis  Lesion L lower lid involving puncta    RECORDS REQUESTED FROM:       Clinic name Comments Records Status Imaging Status   [facility] In [facility]    [facility] Notes sent to [facility] Sent to [facility]

## 2018-08-06 ENCOUNTER — OFFICE VISIT (OUTPATIENT)
Dept: OPHTHALMOLOGY | Facility: CLINIC | Age: 58
End: 2018-08-06
Payer: COMMERCIAL

## 2018-08-06 ENCOUNTER — PRE VISIT (OUTPATIENT)
Dept: OPHTHALMOLOGY | Facility: CLINIC | Age: 58
End: 2018-08-06

## 2018-08-06 VITALS — BODY MASS INDEX: 37.77 KG/M2 | HEIGHT: 73 IN | WEIGHT: 285 LBS

## 2018-08-06 DIAGNOSIS — H02.9 LESION OF LEFT EYELID: Primary | ICD-10-CM

## 2018-08-06 RX ORDER — METOPROLOL SUCCINATE 25 MG/1
25 TABLET, EXTENDED RELEASE ORAL
COMMUNITY
Start: 2018-02-24 | End: 2019-03-01

## 2018-08-06 RX ORDER — INSULIN DEGLUDEC 200 U/ML
INJECTION, SOLUTION SUBCUTANEOUS
Refills: 1 | COMMUNITY
Start: 2018-07-31

## 2018-08-06 RX ORDER — ATORVASTATIN CALCIUM 80 MG/1
TABLET, FILM COATED ORAL
COMMUNITY

## 2018-08-06 RX ORDER — DULAGLUTIDE 0.75 MG/.5ML
INJECTION, SOLUTION SUBCUTANEOUS
Refills: 2 | COMMUNITY
Start: 2018-07-31

## 2018-08-06 RX ORDER — DOXAZOSIN 4 MG/1
TABLET ORAL
COMMUNITY

## 2018-08-06 RX ORDER — ERYTHROMYCIN 5 MG/G
0.5 OINTMENT OPHTHALMIC 3 TIMES DAILY
Refills: 0
Start: 2018-08-06

## 2018-08-06 RX ORDER — EZETIMIBE 10 MG/1
10 TABLET ORAL
COMMUNITY

## 2018-08-06 RX ORDER — HYDROCHLOROTHIAZIDE 25 MG/1
TABLET ORAL
COMMUNITY

## 2018-08-06 RX ORDER — ENALAPRIL MALEATE 20 MG/1
20 TABLET ORAL
COMMUNITY

## 2018-08-06 RX ORDER — NITROGLYCERIN 0.4 MG/1
TABLET SUBLINGUAL
COMMUNITY

## 2018-08-06 RX ORDER — ASPIRIN 81 MG/1
81 TABLET ORAL
COMMUNITY

## 2018-08-06 ASSESSMENT — VISUAL ACUITY
OD_CC: 20/20
METHOD: SNELLEN - LINEAR
CORRECTION_TYPE: GLASSES
OS_CC: 20/20

## 2018-08-06 ASSESSMENT — SLIT LAMP EXAM - LIDS: COMMENTS: UPPER LID DERMATOCHALASIS

## 2018-08-06 ASSESSMENT — EXTERNAL EXAM - RIGHT EYE: OD_EXAM: NORMAL

## 2018-08-06 ASSESSMENT — CONF VISUAL FIELD
OD_NORMAL: 1
OS_NORMAL: 1
METHOD: COUNTING FINGERS

## 2018-08-06 ASSESSMENT — TONOMETRY
OS_IOP_MMHG: 12
OD_IOP_MMHG: 10
IOP_METHOD: ICARE

## 2018-08-06 ASSESSMENT — EXTERNAL EXAM - LEFT EYE: OS_EXAM: NORMAL

## 2018-08-06 NOTE — MR AVS SNAPSHOT
After Visit Summary   8/6/2018    Gonsalo Yoon Jr.    MRN: [id number]           Patient Information     Date Of Birth          1960        Visit Information        Provider Department      8/6/2018 2:30 PM Hai Teran MD [facility] Ophthalmology        Today's Diagnoses     Lesion of left eyelid - Left Eye    -  1      Care Instructions    Hai Teran M.D.    POST OPERATIVE INSTRUCTIONS   OFFICE EYELID SURGERY      1. Ice pack immediately after surgery. Alternate ten minutes on and ten minutes off for the first 24 - 48 hours, while in a reclined position with two pillows under your head while awake.     2. You can use Tylenol extra strength for pain as directed on the bottle.     3. Sleep with two pillows under your head for the first night following surgery.     4.  If bandaged, remove the dressing 24 hours after surgery.     5. Use ointment along the incision both morning and evening until your return visit. If you get ointment in your eye, it will blur your vision slightly.     6. Avoid aspirin or anti-inflammatory medications such as Advil or Motrin for one week following your surgery unless otherwise directed.     7. Avoid strenuous activity or straining for the first week after surgery.     8. Bathing and showers are OK but to facilitate healing, do not wash or apply water to the sutured areas.     9. Small amounts of bright red blood normally stain the bandages. Some blurring of vision can be expected due to drainage and ointment in the eyes.     10. If your eyes swell shut, you cannot establish vision in the operated eye, or the pain is not reasonably controlled with medication you must contact the eye clinic immediately.     11. If you should have a problem after normal office hours, you can reach the ophthalmologist on call at [phone]. If for some reason no one can be reached, proceed to the nearest emergency room for evaluation and treatment.             Follow-ups  "after your visit        Future tests that were ordered for you today     Open Future Orders        Priority Expected Expires Ordered    Surgical pathology exam Routine  2018            Who to contact     Please call your clinic at [phone] to:    Ask questions about your health    Make or cancel appointments    Discuss your medicines    Learn about your test results    Speak to your doctor            Additional Information About Your Visit        MyChart Information     [employer] is an electronic gateway that provides easy, online access to your medical records. With [employer], you can request a clinic appointment, read your test results, renew a prescription or communicate with your care team.     To sign up for [employer] visit the website at [email or web address]   You will be asked to enter the access code listed below, as well as some personal information. Please follow the directions to create your username and password.     Your access code is: [email or web address]  Expires: 10/21/2018  6:31 AM     Your access code will  in 90 days. If you need help or a new code, please contact your [facility] or call [phone] for assistance.        Care EveryWhere ID     This is your Care EveryWhere ID. This could be used by other organizations to access your [place] medical records  [id number]        Your Vitals Were     Height BMI (Body Mass Index)                1.854 m (6' 1\") 37.6 kg/m2           Blood Pressure from Last 3 Encounters:   No data found for BP    Weight from Last 3 Encounters:   18 129.3 kg (285 lb)              We Performed the Following     External Photos OU (both eyes)          Today's Medication Changes          These changes are accurate as of 18  4:16 PM.  If you have any questions, ask your nurse or doctor.               Start taking these medicines.        Dose/Directions    erythromycin ophthalmic ointment   Commonly known as:  " ROMYCIN   Used for:  Lesion of left eyelid   Started by:  Hai Teran MD        Dose:  0.5 inch   Place 0.5 inches Into the left eye 3 times daily   Refills:  0            Where to get your medicines      Some of these will need a paper prescription and others can be bought over the counter.  Ask your nurse if you have questions.     You don't need a prescription for these medications     erythromycin ophthalmic ointment                Primary Care Provider    None Specified       No primary provider on file.        Equal Access to Services     [facility] : Hadii don hilton hadjoseo Sofelipe, waaxda luqadaha, qaybta kaalmada bianca, brent porras . So [facility] [phone].    ATENCIÓN: Si cinthya domínguez, tiene a nguyen disposición servicios gratuitos de asistencia lingüística. Llame al [phone].    We comply with applicable federal civil rights laws and [place] laws. We do not discriminate on the basis of race, color, national origin, age, disability, sex, sexual orientation, or gender identity.            Thank you!     Thank you for choosing [facility] OPHTHALMOLOGY  for your care. Our goal is always to provide you with excellent care. Hearing back from our patients is one way we can continue to improve our services. Please take a few minutes to complete the written survey that you may receive in the mail after your visit with us. Thank you!             Your Updated Medication List - Protect others around you: Learn how to safely use, store and throw away your medicines at www.disposemymeds.org.          This list is accurate as of 8/6/18  4:16 PM.  Always use your most recent med list.                   Brand Name Dispense Instructions for use Diagnosis    aspirin 81 MG EC tablet      81 mg        atorvastatin 80 MG tablet    LIPITOR     Take 80 mg by mouth once daily.        doxazosin 4 MG tablet    CARDURA     Take 4 mg by mouth daily every night.        enalapril 20 MG tablet     VASOTEC     20 mg        erythromycin ophthalmic ointment    ROMYCIN     Place 0.5 inches Into the left eye 3 times daily    Lesion of left eyelid       ezetimibe 10 MG tablet    ZETIA     10 mg        hydrochlorothiazide 25 MG tablet    HYDRODIURIL     Take 25 mg by mouth once daily.        metFORMIN 500 MG tablet    GLUCOPHAGE     500 mg        metoprolol succinate 25 MG 24 hr tablet    TOPROL-XL     25 mg        nitroGLYcerin 0.4 MG sublingual tablet    NITROSTAT     Place 0.4 mg under the tongue every 5 (five) minutes as needed for chest pain.        omeprazole 20 MG CR capsule    priLOSEC     40 mg        sitagliptin 100 MG tablet    JANUVIA     100 mg        ticagrelor 90 MG tablet    BRILINTA     90 mg        TRESIBA FLEXTOUCH 200 UNIT/ML pen   Generic drug:  insulin degludec      INJECT 40 UNITS EVERY DAY BY SUBCUTANEOUS ROUTE AT BEDTIME.        TRULICITY 0.75 MG/0.5ML pen   Generic drug:  dulaglutide      INJECT 0.5 ML EVERY WEEK BY SUBCUTANEOUS ROUTE.

## 2018-08-06 NOTE — PROGRESS NOTES
Oculoplastic Clinic New Patient    Patient: Gonsalo Yoon Jr. MRN# [id number]   YOB: 1960 Age: 57 year old   Date of Visit: Aug 6, 2018    CC: Eyelid lesion       HPI:     Gonsalo Yoon Jr. is a 57 year old male who has noted a lesion on the left lower eyelid. It has been present for 1 year and has been growing. This has not been biopsied.       - Notices that he has some mattering in the morning in the left eye; no change in tearing   - mildly tender in morning   - Growing over past year, does not drain and has not been drained   - No history of eyelid lesions, no eyelid/eye surgeries; wears corrective lenses     Past Medical History / Medications  - HTN, CAD, DM-2 (insulin, metformin)  - On ticagrelor for coronary stent, ASA-81         Assessment and Plan:   Left lower eyelid lesion     PLAN left lower lid biopsy in office today    We discussed risks benefits and alternatives to the proposed procedure. All patient questions were answered. Patient understands that residents and fellows will be involved in surgery at a level deemed fit by the attending surgeon.     Ivan Hampton MD  Ophthalmology Resident  PGY-2     Attending Physician Attestation:  Complete documentation of historical and exam elements from today's encounter can be found in the full encounter summary report (not reduplicated in this progress note).  I personally obtained the chief complaint(s) and history of present illness.  I confirmed and edited as necessary the review of systems, past medical/surgical history, family history, social history, and examination findings as documented by others; and I examined the patient myself.  I personally reviewed the relevant tests, images, and reports as documented above.  I formulated and edited as necessary the assessment and plan and discussed the findings and management plan with the patient and family. I personally reviewed the ophthalmic test(s) associated with this encounter, agree with  the interpretation(s) as documented by the resident/fellow, and have edited the corresponding report(s) as necessary.   -Hai Teran MD    Today with Gonsalo Yoon   and his wife, I reviewed the indications, risks, benefits, and alternatives of the proposed surgical procedure including, but not limited to, failure obtain the desired result  and need for additional surgery, bleeding, infection, loss of vision, loss of the eye, and the remote possibility of permanent damage to any organ system or death with the use of anesthesia.  I provided multiple opportunities for the questions, answered all questions to the best of my ability, and confirmed that my answers and my discussion were understood.     - Hai Teran MD 3:41 PM 8/6/2018

## 2018-08-06 NOTE — NURSING NOTE
Chief Complaints and History of Present Illnesses   Patient presents with     Consult For     LLL lesion     HPI    Affected eye(s):  Left   Symptoms:     No blurred vision      Frequency:  Constant       Do you have eye pain now?:  No      Comments:  LLL lesion, gotten bigger in last year, used to be a little dot. Worse in AM when eye is goopy. Itches when things get into the eye. Not painful. No discharge. Not using eye drops      Cheryl Rueda COT 2:57 PM August 6, 2018

## 2018-08-06 NOTE — PATIENT INSTRUCTIONS
Hai Teran M.D.    POST OPERATIVE INSTRUCTIONS   OFFICE EYELID SURGERY      1. Ice pack immediately after surgery. Alternate ten minutes on and ten minutes off for the first 24 - 48 hours, while in a reclined position with two pillows under your head while awake.     2. You can use Tylenol extra strength for pain as directed on the bottle.     3. Sleep with two pillows under your head for the first night following surgery.     4.  If bandaged, remove the dressing 24 hours after surgery.     5. Use ointment along the incision both morning and evening until your return visit. If you get ointment in your eye, it will blur your vision slightly.     6. Avoid aspirin or anti-inflammatory medications such as Advil or Motrin for one week following your surgery unless otherwise directed.     7. Avoid strenuous activity or straining for the first week after surgery.     8. Bathing and showers are OK but to facilitate healing, do not wash or apply water to the sutured areas.     9. Small amounts of bright red blood normally stain the bandages. Some blurring of vision can be expected due to drainage and ointment in the eyes.     10. If your eyes swell shut, you cannot establish vision in the operated eye, or the pain is not reasonably controlled with medication you must contact the eye clinic immediately.     11. If you should have a problem after normal office hours, you can reach the ophthalmologist on call at [phone]. If for some reason no one can be reached, proceed to the nearest emergency room for evaluation and treatment.

## 2018-08-06 NOTE — LETTER
"August 9, 2018      Gonsalo Henry Jr.  [address] [address]        Dear Gonsalo,    Please see below for your test results. The biopsy showed a BENIGN cyst.     Resulted Orders   Surgical pathology exam   Result Value Ref Range    Copath Report       Patient Name: GONSALO HENRY  MR#: [id number]  Specimen #: [id number]  Collected: 8/6/2018  Received: 8/6/2018  Reported: 8/8/2018 17:06  Ordering Phy(s): RAYMON TERAN    For improved result formatting, select 'View Enhanced Report Format' under   Linked Documents section.    SPECIMEN(S):  Cyst, left lower eyelid    FINAL DIAGNOSIS:  Skin, left lower eyelid:  - Hidrocystoma - (see description)    I have personally reviewed all specimens and/or slides, including the   listed special stains, and used them  with my medical judgement to determine or confirm the final diagnosis.    Electronically signed out by:    Andrea Arias M.D., PhD, [facility]    CLINICAL HISTORY:  The patient is a 57-year-old male  GROSS:  A: The specimen is received in formalin with proper patient   identification, labeled \"left lower eyelid\".  The  specimen consists of two segments of tan soft tissue ranging from 0.3-0.4   cm in greatest dimension.  It is  entirely submitted in cassette A1. (Dictated by: RADHA HARP [facility]   8/7/2018 09:41 AM)    MICROSCOPIC:  The specimen exhibits a cystic cavity with a collapsed, convoluted   architecture which is lined by a double  layer of ductal epithelium with apocrine differentiation.    CPT Codes:  A: 78799-OL4    TESTING LAB LOCATION:  [facility], [address]   [address]  [phone]    COLLECTION SITE:  [place] [facility]  [place] [facility] [place]           If you have any questions, please call the clinic to make an appointment.    Sincerely,    Raymon Teran MD  "

## 2018-08-08 LAB — COPATH REPORT: NORMAL

## 2018-09-28 NOTE — EDM.PDOC
<Santiago Garcia L - Last Filed: 09/28/18 16:04>





ED HPI GENERAL MEDICAL PROBLEM





- General


Chief Complaint: Chest Pain


Stated Complaint: CHEST PAIN


Time Seen by Provider: 09/28/18 10:01


Source of Information: Reports: Patient, RN Notes Reviewed





- History of Present Illness


INITIAL COMMENTS - FREE TEXT/NARRATIVE: 





57 year old male had onset of chest pain this past morning about 5 hrs ago.  

This started as a "sharp jolt anterior chest with radiation to L shoulder and 

arm".  This was moderately severe for about 2 to 3 minutes associated with mild 

dyspnea.  No nausea, vomiting or diaphoresis.  Has continue to have mild 

heaviness anterior chest now without radiation. No abd. pain.  He felt 

completely  normal yesterday.  


  ** Left Chest


Pain Score (Numeric/FACES): 3





- Related Data


 Allergies











Allergy/AdvReac Type Severity Reaction Status Date / Time


 


No Known Allergies Allergy   Verified 09/28/18 09:40











Home Meds: 


 Home Meds





Aspirin [Ecotrin] 81 mg PO DAILY 02/20/18 [History]


Doxazosin [Cardura] 4 mg PO BEDTIME 02/20/18 [History]


Enalapril [Vasotec] 10 mg PO DAILY 02/20/18 [History]


Ezetimibe [Zetia] 10 mg PO DAILY 02/20/18 [History]


Insulin Glargine,Hum.Rec.Anlog [Toujeo Solostar] 40 unit SQ BEDTIME 02/20/18 [

History]


Nitroglycerin 0.4 mg SL ASDIRECTED PRN 02/20/18 [History]


Omeprazole 20 mg PO DAILY 02/20/18 [History]


SitaGLIPtin [Januvia] 100 mg PO DAILY 02/20/18 [History]


atorvaSTATin [Lipitor] 80 mg PO DAILY 02/20/18 [History]


hydroCHLOROthiazide [Hydrochlorothiazide] 25 mg PO DAILY 02/20/18 [History]


metFORMIN [Glucophage] 500 mg PO BIDMEALS 02/20/18 [History]


Metoprolol Succinate 25 mg PO DAILY 09/28/18 [History]


Ticagrelor [Brilinta] 90 mg PO BID 09/28/18 [History]











Past Medical History


HEENT History: Reports: Impaired Vision


Other HEENT History: wears eyeglasses.


Cardiovascular History: Reports: High Cholesterol, Hypertension


Respiratory History: Reports: Sleep Apnea, Other (See Below)


Other Respiratory History: has a CPAP at home, but states he doesn't weart it 

all the time


Gastrointestinal History: Reports: GERD


Musculoskeletal History: Reports: Fracture


Endocrine/Metabolic History: Reports: Diabetes, Type II, IDDM





- Infectious Disease History


Infectious Disease History: Reports: Chicken Pox, Measles, Mumps





- Past Surgical History


Cardiovascular Surgical History: Reports: Other (See Below)


Other Cardiovascular Surgeries/Procedures: 2 stress tests, negative for MI.


Respiratory Surgical History: Reports: None


GI Surgical History: Reports: Appendectomy


Musculoskeletal Surgical History: Reports: Shoulder Surgery





Social & Family History





- Family History


Cardiac: Reports: Other (See Below)


Other Cardiac Family History: father had quadruple bypass, several family 

members on mother's side who passed from heart issues, older sister had a triple

-bypass





- Tobacco Use


Smoking Status *Q: Never Smoker


Second Hand Smoke Exposure: No





- Caffeine Use


Caffeine Use: Reports: Coffee


Caffeine Use Comment: pt. drinks 3 large cups of coffee a day





- Recreational Drug Use


Recreational Drug Use: No





ED ROS GENERAL





- Review of Systems


Review Of Systems: See Below


Constitutional: Denies: Diaphoresis


HEENT: Reports: No Symptoms


Cardiovascular: Reports: Lightheadedness (gone)


GI/Abdominal: Denies: Abdominal Pain, Nausea, Vomiting


Musculoskeletal: Reports: Shoulder Pain, Arm Pain (gone).  Denies: Neck Pain


Skin: Reports: No Symptoms


Neurological: Reports: No Symptoms (mild, gone), Dizziness.  Denies: Numbness, 

Tingling, Trouble Speaking, Difficulty Walking, Weakness





ED EXAM, GENERAL





- Physical Exam


Exam: See Below


Eye Exam: Bilateral Eye: PERRL


Throat/Mouth: Normal Inspection, Normal Oropharynx


Head: Atraumatic.  No: Facial Swelling


Neck: Supple, Other


Back Exam: No: CVA Tenderness (L), CVA Tenderness (R)


Extremities: No: Leg Pain


Neurological: No Motor/Sensory Deficits





EKG INTERPRETATION


EKG Date: 09/28/18


Rhythm: NSR


Axis: Normal


P-Wave: Present


QRS: Other (q waves III, AVF)


ST-T: Normal





Course





- Vital Signs


Last Recorded V/S: 


 Last Vital Signs











Temp  98.4 F   09/28/18 09:40


 


Pulse  71   09/28/18 09:40


 


Resp  13   09/28/18 09:40


 


BP  163/92 H  09/28/18 09:40


 


Pulse Ox  96   09/28/18 09:40














- Orders/Labs/Meds


Orders: 


 Active Orders 24 hr











 Category Date Time Status


 


 EKG 12 Lead [EKG Documentation Completion] [RC] STAT Care  09/28/18 10:12 

Active


 


 Peripheral IV Care [RC] .AS DIRECTED Care  09/28/18 10:13 Active


 


 CBC W/O DIFF,HEMOGRAM [HEME] MOTH@0700 Lab  10/01/18 07:00 Ordered


 


 CBC W/O DIFF,HEMOGRAM [HEME] MOTH@0700 Lab  10/04/18 07:00 Ordered


 


 CBC W/O DIFF,HEMOGRAM [HEME] MOTH@0700 Lab  10/08/18 07:00 Ordered


 


 CBC W/O DIFF,HEMOGRAM [HEME] MOTH@0700 Lab  10/11/18 07:00 Ordered


 


 CBC W/O DIFF,HEMOGRAM [HEME] MOTH@0700 Lab  10/15/18 07:00 Ordered


 


 CBC W/O DIFF,HEMOGRAM [HEME] MOTH@0700 Lab  10/18/18 07:00 Ordered


 


 Heparin Sodium/D5W [Heparin 25,000 Units in D5W 500 ML] Med  09/28/18 15:45 

Active





 25,000 units in 500 ml IV TITRATE   


 


 Sodium Chloride 0.9% [Saline Flush] Med  09/28/18 10:13 Active





 10 ml FLUSH ASDIRECTED PRN   


 


 Peripheral IV Insertion Adult [OM.PC] Stat Oth  09/28/18 10:13 Ordered








 Medication Orders





Heparin Sodium/Dextrose (Heparin 25,000 Units In D5w 500 Ml)  25,000 units in 

500 mls @ 20 mls/hr IV TITRATE [facility]; Protocol


   Last Admin: 09/28/18 16:04  Dose: 1,000 units/hr, 20 mls/hr


Sodium Chloride (Saline Flush)  10 ml FLUSH ASDIRECTED PRN


   PRN Reason: Keep Vein Open


   Last Admin: 09/28/18 10:19  Dose: 10 ml








Labs: 


 Laboratory Tests











  09/28/18 09/28/18 09/28/18 Range/Units





  09:40 09:40 12:48 


 


WBC  7.05    (4.23-9.07)  K/mm3


 


RBC  4.45 L    (4.63-6.08)  M/mm3


 


Hgb  13.8    (13.7-17.5)  gm/L


 


Hct  40.0 L    (40.1-51.0)  %


 


MCV  89.9    (79.0-92.2)  fl


 


MCH  31.0    (25.7-32.2)  pg


 


MCHC  34.5    (32.2-35.5)  g/dl


 


RDW Std Deviation  43.5    (35.1-43.9)  fL


 


Plt Count  230    (163-337)  K/mm3


 


MPV  9.1 L    (9.4-12.3)  fl


 


Neut % (Auto)  65.1    (34.0-67.9)  %


 


Lymph % (Auto)  24.3    (21.8-53.1)  %


 


Mono % (Auto)  5.8    (5.3-12.2)  %


 


Eos % (Auto)  4.0    (0.8-7.0)  


 


Baso % (Auto)  0.7    (0.1-1.2)  %


 


Neut # (Auto)  4.59    (1.78-5.38)  K/mm3


 


Lymph # (Auto)  1.71    (1.32-3.57)  K/mm3


 


Mono # (Auto)  0.41    (0.30-0.82)  K/mm3


 


Eos # (Auto)  0.28    (0.04-0.54)  K/mm3


 


Baso # (Auto)  0.05    (0.01-0.08)  K/mm3


 


Sodium   138   (136-145)  mEq/L


 


Potassium   4.0   (3.5-5.1)  mEq/L


 


Chloride   102   ()  mEq/L


 


Carbon Dioxide   25   (21-32)  mEq/L


 


Anion Gap   15.0   (5-15)  


 


BUN   19 H   (7-18)  mg/dL


 


Creatinine   0.9   (0.7-1.3)  mg/dL


 


Est Cr Clr Drug Dosing   102.34   mL/min


 


Estimated GFR (MDRD)   > 60   (>60)  mL/min


 


BUN/Creatinine Ratio   21.1 H   (14-18)  


 


Glucose   113 H   ()  mg/dL


 


Calcium   9.0   (8.5-10.1)  mg/dL


 


Total Bilirubin   0.5   (0.2-1.0)  mg/dL


 


AST   31   (15-37)  U/L


 


ALT   75 H   (16-63)  U/L


 


Alkaline Phosphatase   86   ()  U/L


 


Troponin I   0.020  0.022  (0.00-0.056)  ng/mL


 


Total Protein   8.5 H   (6.4-8.2)  g/dl


 


Albumin   4.1   (3.4-5.0)  g/dl


 


Globulin   4.4   gm/dL


 


Albumin/Globulin Ratio   0.9 L   (1-2)  











Meds: 


Medications











Generic Name Dose Route Start Last Admin





  Trade Name Freq  PRN Reason Stop Dose Admin


 


Heparin Sodium/Dextrose  25,000 units in 500 mls @ 20 mls/hr  09/28/18 15:45  09 /28/18 16:04





  Heparin 25,000 Units In D5w 500 Ml  IV   1,000 units/hr





  TITRATE [place]   20 mls/hr





     Administration





     





  Protocol   





  1,000 UNITS/HR   


 


Sodium Chloride  10 ml  09/28/18 10:13  09/28/18 10:19





  Saline Flush  FLUSH   10 ml





  ASDIRECTED PRN   Administration





  Keep Vein Open   





     





     





     














Discontinued Medications














Generic Name Dose Route Start Last Admin





  Trade Name Freq  PRN Reason Stop Dose Admin


 


Aspirin  162 mg  09/28/18 10:13  09/28/18 10:20





  Aspirin  PO  09/28/18 10:14  162 mg





  ONETIME ONE   Administration





     





     





     





     


 


Heparin Sodium (Porcine)  4,000 units  09/28/18 15:26  09/28/18 15:38





  Heparin Sodium  IVPUSH  09/28/18 15:27  4,000 units





  ONETIME ONE   Administration





     





     





     





     


 


Nitroglycerin  1 gm  09/28/18 10:13  09/28/18 10:20





  Nitro-Bid 2%  TOP  09/28/18 10:14  1 gm





  ONETIME ONE   Administration





     





     





     





     














- Re-Assessments/Exams


Free Text/Narrative Re-Assessment/Exam: 





09/28/18 11:15.  Cocumented hx and exam done by Nikki WILLSON, 4th year medical 

student.  I have also taken detailed hx, examined patient.  I agree with hx and 

exam as documented.  Initial trop came back at 0.020,  He was almost napping 

when I walked in the room but when awakened states he still has very mild 

anterior chest "tightness".   EKG does not show acute findings.  Sinus rythm, 

no ecotpy.  CXR did not show acute findings. 13:30.  Repeat trop. .022., 

basically unchanged.  He continues to described very mild anterior chest 

tightness although he does not look uncomfortable.  Have consulted with Dr Rosales, Cardiologist on call for [facility]. He 

suggest admission for R/O and stress test in the morning.  However with 

tomorrow being Saturday that can not be done tomorrow morning or the following 

morning at our Hospital.  Therefore arrangements have been made for transfer to 

[facility].  Dr Mittal accepting Hospitalist.  








Departure





- Departure


Time of Disposition: 16:00


Disposition: DC/Tfer to Acute Hospital 02


Reason for Transfer *Q: Other


Condition: Fair


Clinical Impression: 


Chest pain


Qualifiers:


 Ischemic chest pain type: unspecified angina pectoris type 





Referrals: 


Nelida Velazquez MD [Primary Care Provider] - 


Forms:  ED Department Discharge





<Nikki Gandhi - Last Filed: 09/28/18 17:33>





ED HPI GENERAL MEDICAL PROBLEM





- General


Source of Information: Reports: Patient


History Limitations: Reports: No Limitations





- History of Present Illness


INITIAL COMMENTS - FREE TEXT/NARRATIVE: 


Edward reports he was hooking up a grain trailer this morning when he had 

sudden onset of sharp, stinging pain which radiated from his mid-chest to his 

left shoulder and down the left arm to the level of the elbow.  He was not 

lifting anything at the time, but was "cranking up the jack." He did feel like 

he was exerting himself at the time. The pain was present for 1 minute and did 

not return. He does report chest tightness currently. Past medical history is 

significant for CAD with 1 x stent placement, HTN, HLD, GERD, and DM Type II. 

In February 2018, he presented to the ED with diaphoresis and chest tightness, 

and was eventually treated with stent placement. He is still taking Brilinta (

ticagrelor) and aspirin. 





Onset: Today, Sudden





ED ROS GENERAL





- Review of Systems


Constitutional: Denies: Fever, Chills, Malaise, Weakness


Respiratory: Denies: Shortness of Breath, Pleuritic Chest Pain (no pain with 

deep inspiration), Cough


Cardiovascular: Reports: Chest Pain (as described in HPI).  Denies: Blood 

Pressure Problem, Dyspnea on Exertion, Palpitations


Musculoskeletal: Reports: No Symptoms


Neurological: Reports: No Symptoms





ED EXAM, GENERAL





- Physical Exam


Exam Limited By: No Limitations


General Appearance: Alert, No Apparent Distress, Obese


Respiratory/Chest: No Respiratory Distress, Lungs Clear, Normal Breath Sounds, 

Other (chest is tender to palpation over sternum)


Cardiovascular: Normal Peripheral Pulses, Regular Rate, Rhythm, No JVD, 

Systolic Murmur (1/6 systolic murmur loudest at left lower sternal border. 

Decreases in intensity on inspiration. Heart sounds difficult to auscultate due 

to body habitus. ).  No: Friction Rub


Peripheral Pulses: 2+: Radial (L), Radial (R)


GI/Abdominal: Soft, Non-Tender, Other (obese abdomen)


Extremities: Pedal Edema (1+ pitting edema of legs bilaterally)


Neurological: Alert, Oriented, Normal Cognition


Psychiatric: Normal Affect, Normal Mood


Skin Exam: Warm, Dry, Normal Color.  No: Diaphoretic

## 2018-09-28 NOTE — CR
Chest: Portable view of the chest was obtained.

 

Comparison: Prior chest x-ray of 02/20/18.

 

Heart size and mediastinum are within normal limits for portable 

technique.  Lungs are clear.  Bony structures are grossly intact.

 

Impression:

1.  Nothing acute is seen on portable chest x-ray.

 

Diagnostic code #1

## 2020-10-14 ENCOUNTER — HOSPITAL ENCOUNTER (EMERGENCY)
Dept: HOSPITAL 41 - JD.ED | Age: 60
Discharge: NURSING, REHAB OR LONG-TERM CARE | End: 2020-10-14
Payer: COMMERCIAL

## 2020-10-14 VITALS — DIASTOLIC BLOOD PRESSURE: 68 MMHG | SYSTOLIC BLOOD PRESSURE: 153 MMHG

## 2020-10-14 VITALS — HEART RATE: 80 BPM

## 2020-10-14 DIAGNOSIS — Z79.899: ICD-10-CM

## 2020-10-14 DIAGNOSIS — J12.89: ICD-10-CM

## 2020-10-14 DIAGNOSIS — R79.89: ICD-10-CM

## 2020-10-14 DIAGNOSIS — U07.1: Primary | ICD-10-CM

## 2020-10-14 DIAGNOSIS — Z79.4: ICD-10-CM

## 2020-10-14 DIAGNOSIS — E11.9: ICD-10-CM

## 2020-10-14 DIAGNOSIS — K21.9: ICD-10-CM

## 2020-10-14 DIAGNOSIS — I10: ICD-10-CM

## 2020-10-14 DIAGNOSIS — E78.00: ICD-10-CM

## 2020-10-14 PROCEDURE — 80053 COMPREHEN METABOLIC PANEL: CPT

## 2020-10-14 PROCEDURE — 87635 SARS-COV-2 COVID-19 AMP PRB: CPT

## 2020-10-14 PROCEDURE — 85025 COMPLETE CBC W/AUTO DIFF WBC: CPT

## 2020-10-14 PROCEDURE — 96374 THER/PROPH/DIAG INJ IV PUSH: CPT

## 2020-10-14 PROCEDURE — U0002 COVID-19 LAB TEST NON-CDC: HCPCS

## 2020-10-14 PROCEDURE — 36415 COLL VENOUS BLD VENIPUNCTURE: CPT

## 2020-10-14 PROCEDURE — 94640 AIRWAY INHALATION TREATMENT: CPT

## 2020-10-14 PROCEDURE — 85379 FIBRIN DEGRADATION QUANT: CPT

## 2020-10-14 PROCEDURE — 86140 C-REACTIVE PROTEIN: CPT

## 2020-10-14 PROCEDURE — 84484 ASSAY OF TROPONIN QUANT: CPT

## 2020-10-14 PROCEDURE — 93005 ELECTROCARDIOGRAM TRACING: CPT

## 2020-10-14 PROCEDURE — 99285 EMERGENCY DEPT VISIT HI MDM: CPT

## 2020-10-14 PROCEDURE — 83605 ASSAY OF LACTIC ACID: CPT

## 2020-10-14 PROCEDURE — 71045 X-RAY EXAM CHEST 1 VIEW: CPT

## 2020-10-14 PROCEDURE — 83880 ASSAY OF NATRIURETIC PEPTIDE: CPT

## 2020-10-14 PROCEDURE — 87040 BLOOD CULTURE FOR BACTERIA: CPT

## 2020-10-14 PROCEDURE — 83615 LACTATE (LD) (LDH) ENZYME: CPT

## 2020-10-14 PROCEDURE — 82728 ASSAY OF FERRITIN: CPT

## 2020-10-14 NOTE — EDM.PDOC
ED HPI GENERAL MEDICAL PROBLEM





- General


Chief Complaint: Respiratory Problem


Stated Complaint: FEVER/COUGH


Time Seen by Provider: 10/14/20 14:11


Source of Information: Reports: Patient


History Limitations: Reports: No Limitations





- History of Present Illness


INITIAL COMMENTS - FREE TEXT/NARRATIVE: 





Patient is a 6-year-old male presenting to the emergency department with complai

nts of fever, chills, cough, and shortness of breath.  Symptoms began on Sunday 

with significant chills.  He has had a cough consistently since that time.  

Today he developed some fairly significant shortness of breath.  States he was 

taking a shower prior to come to the ER and had to sit down multiple times to 

catch his breath.  He states that yesterday he had some abdominal discomfort, 

diarrhea and nausea, however he did not vomit.  Patient has a history of 

coronary artery disease with stenting as well as type 2 diabetes and high 

cholesterol.  On triage, his oxygen was 84% on room air so he was placed on 2 L 

of oxygen by nasal cannula and quickly increased to 96%.  He denies any 

significant chest pain, however states when he coughs he has tightness in his 

chest.  He has had no syncopal or near syncopal episodes.


  ** Chest


Pain Score (Numeric/FACES): 9





- Related Data


                                    Allergies











Allergy/AdvReac Type Severity Reaction Status Date / Time


 


No Known Allergies Allergy   Verified 09/28/18 09:40











Home Meds: 


                                    Home Meds





Aspirin [Ecotrin EC] 81 mg PO DAILY 02/20/18 [History]


Doxazosin [Cardura] 4 mg PO BEDTIME 02/20/18 [History]


Enalapril [Vasotec] 10 mg PO DAILY 02/20/18 [History]


Ezetimibe [Zetia] 10 mg PO DAILY 02/20/18 [History]


Insulin Glargine,Hum.Rec.Anlog [Toujeo Solostar] 40 unit SQ WEEKLY 02/20/18 

[History]


Nitroglycerin 0.4 mg SL ASDIRECTED PRN 02/20/18 [History]


Omeprazole 20 mg PO DAILY 02/20/18 [History]


SitaGLIPtin [Januvia] 100 mg PO DAILY 02/20/18 [History]


atorvaSTATin [Lipitor] 80 mg PO DAILY 02/20/18 [History]


hydroCHLOROthiazide [Hydrochlorothiazide] 25 mg PO DAILY 02/20/18 [History]


metFORMIN [Glucophage] 500 mg PO DAILY 02/20/18 [History]


Metoprolol Succinate 25 mg PO DAILY 09/28/18 [History]


Ticagrelor [Brilinta] 90 mg PO BID 09/28/18 [History]











Past Medical History


HEENT History: Reports: Impaired Vision


Other HEENT History: wears eyeglasses.


Cardiovascular History: Reports: High Cholesterol, Hypertension


Respiratory History: Reports: Sleep Apnea, Other (See Below)


Other Respiratory History: has a CPAP at home, but states he doesn't weart it 

all the time


Gastrointestinal History: Reports: GERD


Musculoskeletal History: Reports: Fracture


Endocrine/Metabolic History: Reports: Diabetes, Type II, IDDM





- Infectious Disease History


Infectious Disease History: Reports: Chicken Pox, Measles, Mumps





- Past Surgical History


Cardiovascular Surgical History: Reports: Other (See Below)


Other Cardiovascular Surgeries/Procedures: 2 stress tests, negative for MI.


Respiratory Surgical History: Reports: None


GI Surgical History: Reports: Appendectomy


Musculoskeletal Surgical History: Reports: Shoulder Surgery





Social & Family History





- Family History


Cardiac: Reports: Other (See Below)


Other Cardiac Family History: father had quadruple bypass, several family 

members on mother's side who passed from heart issues, older sister had a 

triple-bypass





- Tobacco Use


Tobacco Use Status *Q: Never Tobacco User


Second Hand Smoke Exposure: No





- Caffeine Use


Caffeine Use: Reports: Coffee


Caffeine Use Comment: pt. drinks 3 large cups of coffee a day





- Recreational Drug Use


Recreational Drug Use: No





ED ROS GENERAL





- Review of Systems


Review Of Systems: See Below


Constitutional: Reports: Fever, Chills, Weakness, Fatigue


HEENT: Reports: No Symptoms


Respiratory: Reports: Shortness of Breath, Pleuritic Chest Pain, Cough


Cardiovascular: Reports: No Symptoms, Dyspnea on Exertion.  Denies: Chest Pain, 

Lightheadedness, Syncope


Endocrine: Reports: No Symptoms


GI/Abdominal: Reports: Abdominal Pain, Diarrhea, Decreased Appetite, Nausea.  

Denies: Vomiting


: Reports: No Symptoms


Musculoskeletal: Reports: No Symptoms


Skin: Reports: No Symptoms


Neurological: Reports: No Symptoms


Psychiatric: Reports: No Symptoms


Hematologic/Lymphatic: Reports: No Symptoms


Immunologic: Reports: No Symptoms





ED EXAM, GENERAL





- Physical Exam


Exam: See Below


General Appearance: Alert, WD/WN, No Apparent Distress


Respiratory/Chest: No Respiratory Distress, No Accessory Muscle Use, Chest Non-

Tender, Other (Diffuse expiratory wheeze.  Difficulty taking deep breaths 

without inducing cough.)


Cardiovascular: Normal Peripheral Pulses, Regular Rate, Rhythm, No Edema, No 

Gallop, No JVD, No Murmur, No Rub


GI/Abdominal: Normal Bowel Sounds, Soft, Non-Tender, No Organomegaly, No Di

stention, No Abnormal Bruit, No Mass


Neurological: Alert, Oriented, CN II-XII Intact, Normal Cognition, Normal Gait, 

Normal Reflexes, No Motor/Sensory Deficits


Psychiatric: Normal Affect, Normal Mood


Skin Exam: Warm, Dry, Intact, Normal Color, No Rash


  ** #1 Interpretation


EKG Date: 10/14/20


Time: 14:57


Rhythm: NSR


Rate (Beats/Min): 82


Axis: Normal


P-Wave: Present


QRS: Normal


ST-T: Normal


QT: Normal


TN/PQ Interval: prolonged


EKG Interpretation Comments: 





Sinus rhythm at 82


Prolonged TN interval


Abnormal R wave progression, late transition


Old inferior infarct


Borderline ST elevation in the anterior lateral leads


Q waves in lead III


EKG interpreted by Dr. Jose CESPEDES.





Course





- Vital Signs


Last Recorded V/S: 


                                Last Vital Signs











Temp  99.4 F   10/14/20 14:15


 


Pulse  80   10/14/20 17:15


 


Resp  24 H  10/14/20 17:15


 


BP  153/68 H  10/14/20 17:15


 


Pulse Ox  96   10/14/20 17:15














- Orders/Labs/Meds


Labs: 


                                Laboratory Tests











  10/14/20 10/14/20 10/14/20 Range/Units





  15:05 15:05 15:05 


 


WBC  5.37    (4.23-9.07)  K/mm3


 


RBC  3.84 L    (4.63-6.08)  M/mm3


 


Hgb  11.7 L D    (13.7-17.5)  gm/dl


 


Hct  35.7 L    (40.1-51.0)  %


 


MCV  93.0 H D    (79.0-92.2)  fl


 


MCH  30.5    (25.7-32.2)  pg


 


MCHC  32.8    (32.2-35.5)  g/dl


 


RDW Std Deviation  46.2 H    (35.1-43.9)  fL


 


Plt Count  181    (163-337)  K/mm3


 


MPV  9.0 L    (9.4-12.3)  fl


 


Neut % (Auto)  75.2 H    (34.0-67.9)  %


 


Lymph % (Auto)  18.8 L    (21.8-53.1)  %


 


Mono % (Auto)  5.2 L    (5.3-12.2)  %


 


Eos % (Auto)  0.2 L    (0.8-7.0)  


 


Baso % (Auto)  0.4    (0.1-1.2)  %


 


Neut # (Auto)  4.04    (1.78-5.38)  K/mm3


 


Lymph # (Auto)  1.01 L    (1.32-3.57)  K/mm3


 


Mono # (Auto)  0.28 L    (0.30-0.82)  K/mm3


 


Eos # (Auto)  0.01 L    (0.04-0.54)  K/mm3


 


Baso # (Auto)  0.02    (0.01-0.08)  K/mm3


 


D-Dimer, Quantitative   0.40   (0.19-0.50)  mg/L


 


Sodium    135 L  (136-145)  mEq/L


 


Potassium    4.3  (3.5-5.1)  mEq/L


 


Chloride    99  ()  mEq/L


 


Carbon Dioxide    25  (21-32)  mEq/L


 


Anion Gap    15.3 H  (5-15)  


 


BUN    14  (7-18)  mg/dL


 


Creatinine    0.9  (0.7-1.3)  mg/dL


 


Est Cr Clr Drug Dosing    101.48  mL/min


 


Estimated GFR (MDRD)    > 60  (>60)  mL/min


 


BUN/Creatinine Ratio    15.6  (14-18)  


 


Glucose    162 H  ()  mg/dL


 


Lactic Acid     (0.4-2.0)  mmol/L


 


Calcium    8.3 L  (8.5-10.1)  mg/dL


 


Ferritin     ()  ng/ml


 


Total Bilirubin    0.6  (0.2-1.0)  mg/dL


 


AST    32  (15-37)  U/L


 


ALT    54  (16-63)  U/L


 


Alkaline Phosphatase    56  ()  U/L


 


Lactate Dehydrogenase    238 H  ()  U/L


 


Troponin I    0.058 H*  (0.00-0.056)  ng/mL


 


C-Reactive Protein    6.9 H*  (<1.0)  mg/dL


 


NT-Pro-B Natriuret Pep     (0-125)  pg/mL


 


Total Protein    7.5  (6.4-8.2)  g/dl


 


Albumin    3.2 L  (3.4-5.0)  g/dl


 


Globulin    4.3  gm/dL


 


Albumin/Globulin Ratio    0.7 L  (1-2)  


 


SARS-CoV-2 RNA (KIRILL)     (NEGATIVE)  














  10/14/20 10/14/20 10/14/20 Range/Units





  15:05 15:05 15:05 


 


WBC     (4.23-9.07)  K/mm3


 


RBC     (4.63-6.08)  M/mm3


 


Hgb     (13.7-17.5)  gm/dl


 


Hct     (40.1-51.0)  %


 


MCV     (79.0-92.2)  fl


 


MCH     (25.7-32.2)  pg


 


MCHC     (32.2-35.5)  g/dl


 


RDW Std Deviation     (35.1-43.9)  fL


 


Plt Count     (163-337)  K/mm3


 


MPV     (9.4-12.3)  fl


 


Neut % (Auto)     (34.0-67.9)  %


 


Lymph % (Auto)     (21.8-53.1)  %


 


Mono % (Auto)     (5.3-12.2)  %


 


Eos % (Auto)     (0.8-7.0)  


 


Baso % (Auto)     (0.1-1.2)  %


 


Neut # (Auto)     (1.78-5.38)  K/mm3


 


Lymph # (Auto)     (1.32-3.57)  K/mm3


 


Mono # (Auto)     (0.30-0.82)  K/mm3


 


Eos # (Auto)     (0.04-0.54)  K/mm3


 


Baso # (Auto)     (0.01-0.08)  K/mm3


 


D-Dimer, Quantitative     (0.19-0.50)  mg/L


 


Sodium     (136-145)  mEq/L


 


Potassium     (3.5-5.1)  mEq/L


 


Chloride     ()  mEq/L


 


Carbon Dioxide     (21-32)  mEq/L


 


Anion Gap     (5-15)  


 


BUN     (7-18)  mg/dL


 


Creatinine     (0.7-1.3)  mg/dL


 


Est Cr Clr Drug Dosing     mL/min


 


Estimated GFR (MDRD)     (>60)  mL/min


 


BUN/Creatinine Ratio     (14-18)  


 


Glucose     ()  mg/dL


 


Lactic Acid   1.2   (0.4-2.0)  mmol/L


 


Calcium     (8.5-10.1)  mg/dL


 


Ferritin    771 H  ()  ng/ml


 


Total Bilirubin     (0.2-1.0)  mg/dL


 


AST     (15-37)  U/L


 


ALT     (16-63)  U/L


 


Alkaline Phosphatase     ()  U/L


 


Lactate Dehydrogenase     ()  U/L


 


Troponin I     (0.00-0.056)  ng/mL


 


C-Reactive Protein     (<1.0)  mg/dL


 


NT-Pro-B Natriuret Pep  56    (0-125)  pg/mL


 


Total Protein     (6.4-8.2)  g/dl


 


Albumin     (3.4-5.0)  g/dl


 


Globulin     gm/dL


 


Albumin/Globulin Ratio     (1-2)  


 


SARS-CoV-2 RNA (KIRILL)     (NEGATIVE)  














  10/14/20 Range/Units





  15:10 


 


WBC   (4.23-9.07)  K/mm3


 


RBC   (4.63-6.08)  M/mm3


 


Hgb   (13.7-17.5)  gm/dl


 


Hct   (40.1-51.0)  %


 


MCV   (79.0-92.2)  fl


 


MCH   (25.7-32.2)  pg


 


MCHC   (32.2-35.5)  g/dl


 


RDW Std Deviation   (35.1-43.9)  fL


 


Plt Count   (163-337)  K/mm3


 


MPV   (9.4-12.3)  fl


 


Neut % (Auto)   (34.0-67.9)  %


 


Lymph % (Auto)   (21.8-53.1)  %


 


Mono % (Auto)   (5.3-12.2)  %


 


Eos % (Auto)   (0.8-7.0)  


 


Baso % (Auto)   (0.1-1.2)  %


 


Neut # (Auto)   (1.78-5.38)  K/mm3


 


Lymph # (Auto)   (1.32-3.57)  K/mm3


 


Mono # (Auto)   (0.30-0.82)  K/mm3


 


Eos # (Auto)   (0.04-0.54)  K/mm3


 


Baso # (Auto)   (0.01-0.08)  K/mm3


 


D-Dimer, Quantitative   (0.19-0.50)  mg/L


 


Sodium   (136-145)  mEq/L


 


Potassium   (3.5-5.1)  mEq/L


 


Chloride   ()  mEq/L


 


Carbon Dioxide   (21-32)  mEq/L


 


Anion Gap   (5-15)  


 


BUN   (7-18)  mg/dL


 


Creatinine   (0.7-1.3)  mg/dL


 


Est Cr Clr Drug Dosing   mL/min


 


Estimated GFR (MDRD)   (>60)  mL/min


 


BUN/Creatinine Ratio   (14-18)  


 


Glucose   ()  mg/dL


 


Lactic Acid   (0.4-2.0)  mmol/L


 


Calcium   (8.5-10.1)  mg/dL


 


Ferritin   ()  ng/ml


 


Total Bilirubin   (0.2-1.0)  mg/dL


 


AST   (15-37)  U/L


 


ALT   (16-63)  U/L


 


Alkaline Phosphatase   ()  U/L


 


Lactate Dehydrogenase   ()  U/L


 


Troponin I   (0.00-0.056)  ng/mL


 


C-Reactive Protein   (<1.0)  mg/dL


 


NT-Pro-B Natriuret Pep   (0-125)  pg/mL


 


Total Protein   (6.4-8.2)  g/dl


 


Albumin   (3.4-5.0)  g/dl


 


Globulin   gm/dL


 


Albumin/Globulin Ratio   (1-2)  


 


SARS-CoV-2 RNA (KIRILL)  Positive H  (NEGATIVE)  











Meds: 


Medications














Discontinued Medications














Generic Name Dose Route Start Last Admin





  Trade Name Freq  PRN Reason Stop Dose Admin


 


Albuterol  0 gm  10/14/20 14:45  10/14/20 15:49





  Proventil Hfa  INH  10/14/20 14:46  2 puff





  ONETIME ONE   Administration


 


Dexamethasone  4 mg  10/14/20 16:14  10/14/20 17:20





  Dexamethasone  IVPUSH  10/14/20 16:15  4 mg





  ONETIME ONE   Administration


 


Sodium Chloride  10 ml  10/14/20 14:26  10/14/20 15:35





  Saline Flush  FLUSH   10 ml





  ASDIRECTED PRN   Administration





  Keep Vein Open  














- Re-Assessments/Exams


Free Text/Narrative Re-Assessment/Exam: 


Patient is a 60-year-old male presenting to the emergency department with 

complaints of increased shortness of breath.  He has been having symptoms of 

fever and cough since Sunday.  He states today his shortness of breath worsened 

significantly.  He had to stop a number of times while taking a shower to sit 

down and catch his breath.  He has a history of coronary artery disease 

requiring stents after failing outpatient stress test.  He is not never had an 

MI that he is aware of.  He also has a history of high cholesterol and high 

blood pressure as well as sleep apnea wearing a CPAP at night.  On exam, patient

 does have diffuse expiratory wheezing throughout his lung fields.  His oxygen 

saturation on triage was 84% on room air.  He is currently on 2 L of oxygen 

satting in the mid 90s.  I did turn off his oxygen while I was in the room with 

him to see what his resting room air saturation is.  I have ordered a Covid 

work-up including CBC, CMP, CRP, D-dimer, troponin, ferritin, LDH, EKG, chest x-

ray, 1 hour Covid.  I have ordered 2 puffs and albuterol inhaler to be given 

now.





10/14/20 1555


Patient oxygen saturation has dipped as low as 87% on room air while at rest.  

He has been put back on 2 L of oxygen he is now satting in the mid 90s.  

Hematology is significant for a hemoglobin slightly low at 11.7, ferritin 771, 

, troponin 0 0.058, CRP 6.9.  Covid testing is still pending, however my 

suspicion is quite high that the patient has Covid.  He denies any significant 

chest pain.  We will wait for his official Covid results and that I will work on

 getting the patient admitted.  Unfortunately do not have any beds available 

within our facility so we will call around to find a bed for transfer.





10/14/20 1630


Patient's Covid test did return positive.  Unfortunately there are no beds 

available in our facility.  Both [facility] are also on diversion.  I 

called and spoke with Dr. Contreras at [facility].  He has accepted the patient 

for transfer with a direct admission for Covid pneumonia hypoxia.  He did not 

recommend that any additional medications be given.  States they will trend his 

troponin when he arrives.  Patient updated on this and is in agreement plan.  

[facility] will be sending their flight team to transport the patient.











Departure





- Departure


Time of Disposition: 16:20


Disposition: DC/Tfer to Acute Hospital 02


Condition: Good


Clinical Impression: 


 Pneumonia due to 2019 novel coronavirus, Elevated troponin








- Discharge Information


Referrals: 


PCP,Not In Area [Primary Care Provider] - 


Forms:  ED Department Discharge





Sepsis Event Note (ED)





- Evaluation


Sepsis Screening Result: Possible Sepsis Risk

## 2022-05-31 ENCOUNTER — HOSPITAL ENCOUNTER (EMERGENCY)
Dept: HOSPITAL 41 - JD.ED | Age: 62
Discharge: HOME | End: 2022-05-31
Payer: COMMERCIAL

## 2022-05-31 VITALS — DIASTOLIC BLOOD PRESSURE: 83 MMHG | SYSTOLIC BLOOD PRESSURE: 178 MMHG | HEART RATE: 88 BPM

## 2022-05-31 DIAGNOSIS — E11.9: ICD-10-CM

## 2022-05-31 DIAGNOSIS — U07.1: Primary | ICD-10-CM

## 2022-05-31 DIAGNOSIS — E78.00: ICD-10-CM

## 2022-05-31 DIAGNOSIS — Z79.4: ICD-10-CM

## 2022-05-31 DIAGNOSIS — E66.9: ICD-10-CM

## 2022-05-31 DIAGNOSIS — Z79.82: ICD-10-CM

## 2022-05-31 DIAGNOSIS — Z79.899: ICD-10-CM

## 2022-05-31 DIAGNOSIS — I10: ICD-10-CM

## 2023-05-29 NOTE — LETTER
2018         RE:  :  MRN: Gonsalo Yoon Jr.  1960  [id number]     Dear Dr. Zachary Davenport,    Thank you for asking me to see your patient, Gonsalo Yoon Jr., for an oculoplastic   consultation.  My assessment and plan are below.  For further details, please see my attached clinic note.             Assessment and Plan:   Left lower eyelid lesion     PLAN left lower lid biopsy in office today       Again, thank you for allowing me to participate in the care of your patient.      Sincerely,    Hai Teran MD  Department of Ophthalmology and Visual Neurosciences  [facility]    CC: Zachary Davenport MD  [facility] [address]  VIA Mail        see mdm Differential Diagnosis

## (undated) RX ORDER — ERYTHROMYCIN 5 MG/G
OINTMENT OPHTHALMIC
Status: DISPENSED
Start: 2018-08-06